# Patient Record
Sex: FEMALE | Race: WHITE | Employment: PART TIME | URBAN - METROPOLITAN AREA
[De-identification: names, ages, dates, MRNs, and addresses within clinical notes are randomized per-mention and may not be internally consistent; named-entity substitution may affect disease eponyms.]

---

## 2018-12-04 ENCOUNTER — OFFICE VISIT (OUTPATIENT)
Dept: INTERNAL MEDICINE | Facility: CLINIC | Age: 58
End: 2018-12-04
Payer: COMMERCIAL

## 2018-12-04 VITALS
WEIGHT: 112 LBS | TEMPERATURE: 97.8 F | OXYGEN SATURATION: 98 % | DIASTOLIC BLOOD PRESSURE: 60 MMHG | SYSTOLIC BLOOD PRESSURE: 110 MMHG | HEIGHT: 62 IN | BODY MASS INDEX: 20.61 KG/M2 | HEART RATE: 71 BPM

## 2018-12-04 DIAGNOSIS — M43.06 LUMBAR SPONDYLOLYSIS: Primary | ICD-10-CM

## 2018-12-04 DIAGNOSIS — R93.5 ABNORMAL MRI, PELVIS: ICD-10-CM

## 2018-12-04 DIAGNOSIS — M81.0 OSTEOPOROSIS WITHOUT CURRENT PATHOLOGICAL FRACTURE, UNSPECIFIED OSTEOPOROSIS TYPE: ICD-10-CM

## 2018-12-04 DIAGNOSIS — Z13.220 SCREENING FOR LIPID DISORDERS: ICD-10-CM

## 2018-12-04 PROBLEM — L70.0 CYSTIC ACNE: Status: ACTIVE | Noted: 2018-12-04

## 2018-12-04 PROBLEM — Z82.49 FAMILY HISTORY OF HEART DISEASE: Status: ACTIVE | Noted: 2017-11-10

## 2018-12-04 PROBLEM — C44.92 SQUAMOUS CELL CARCINOMA OF SKIN: Status: ACTIVE | Noted: 2018-12-04

## 2018-12-04 PROBLEM — R61 NIGHT SWEATS: Status: RESOLVED | Noted: 2018-12-04 | Resolved: 2018-12-04

## 2018-12-04 PROBLEM — R92.1 BREAST CALCIFICATION, RIGHT: Status: ACTIVE | Noted: 2018-12-04

## 2018-12-04 PROCEDURE — 99213 OFFICE O/P EST LOW 20 MIN: CPT | Performed by: INTERNAL MEDICINE

## 2018-12-04 RX ORDER — RISEDRONATE SODIUM 150 MG/1
150 TABLET, FILM COATED ORAL
COMMUNITY
End: 2023-01-19

## 2018-12-04 ASSESSMENT — ENCOUNTER SYMPTOMS
DIARRHEA: 0
PALPITATIONS: 0
POLYDIPSIA: 0
FREQUENCY: 0
UNEXPECTED WEIGHT CHANGE: 0
SLEEP DISTURBANCE: 0
ACTIVITY CHANGE: 0
BLOOD IN STOOL: 0
APPETITE CHANGE: 0
POLYPHAGIA: 0
FATIGUE: 0
SHORTNESS OF BREATH: 0
HEADACHES: 0
COUGH: 0
BACK PAIN: 1
NERVOUS/ANXIOUS: 0
DIZZINESS: 0
DYSPHORIC MOOD: 0
CHEST TIGHTNESS: 0
CONSTIPATION: 0
NAUSEA: 0
ABDOMINAL PAIN: 0
VOMITING: 0
DIFFICULTY URINATING: 0

## 2018-12-04 NOTE — PATIENT INSTRUCTIONS
Problem List Items Addressed This Visit     Lumbar spondylolysis - Primary     Continue your lifting restrictions and core strengthening.         Relevant Orders    CBC and differential    Comprehensive metabolic panel    TSH    Osteoporosis without current pathological fracture    Screening for lipid disorders    Relevant Orders    Lipid panel    Abnormal MRI, pelvis    Relevant Orders    ULTRASOUND PELVIS COMPLETE TRANSABDOMINAL ONLY

## 2018-12-04 NOTE — PROGRESS NOTES
Subjective      Patient ID: Silvia Billings is a 58 y.o. female.    Pt with back pain that comes and goes.  She does not need medications.  No fever or chills.  Does know what to do for improving her pain when it happens.        The following have been reviewed and updated as appropriate in this visit:  Allergies  Meds  Problems       Review of Systems   Constitutional: Negative for activity change, appetite change, fatigue and unexpected weight change.   HENT: Negative for dental problem and hearing loss.    Eyes: Negative for visual disturbance.   Respiratory: Negative for cough, chest tightness and shortness of breath.    Cardiovascular: Negative for chest pain, palpitations and leg swelling.   Gastrointestinal: Negative for abdominal pain, blood in stool, constipation, diarrhea, nausea and vomiting.   Endocrine: Negative for cold intolerance, heat intolerance, polydipsia, polyphagia and polyuria.   Genitourinary: Negative for difficulty urinating, frequency and urgency.   Musculoskeletal: Positive for back pain.   Skin: Negative for rash.   Neurological: Negative for dizziness and headaches.   Psychiatric/Behavioral: Negative for dysphoric mood and sleep disturbance. The patient is not nervous/anxious.        Past Medical History:   Diagnosis Date   • Breast calcification, right    • Cystic acne    • Night sweats    • Osteoporosis    • Squamous cell carcinoma of skin          Past Surgical History:   Procedure Laterality Date   • BREAST BIOPSY     • BREAST LUMPECTOMY           Family History   Problem Relation Age of Onset   • Lung cancer Mother    • Breast cancer Mother    • Hyperlipidemia Mother    • Lung cancer Father    • Heart disease Sister    • Bipolar disorder Brother    • Hyperlipidemia Brother          No current outpatient prescriptions on file prior to visit.     No current facility-administered medications on file prior to visit.          Allergies   Allergen Reactions   • Codeine      Other  "reaction(s): headache  NA         Objective     Vitals:    12/04/18 1522   BP: 110/60   BP Location: Right upper arm   Patient Position: Sitting   Pulse: 71   Temp: 36.6 °C (97.8 °F)   TempSrc: Oral   SpO2: 98%   Weight: 50.8 kg (112 lb)   Height: 1.575 m (5' 2\")     Body mass index is 20.49 kg/m².    Physical Exam   Constitutional: She is oriented to person, place, and time.   HENT:   Head: Normocephalic and atraumatic.   Right Ear: External ear normal.   Left Ear: External ear normal.   Mouth/Throat: Oropharynx is clear and moist.   Eyes: Conjunctivae and EOM are normal. Pupils are equal, round, and reactive to light.   Neck: Normal range of motion. Neck supple. No JVD present. No tracheal deviation present. No thyromegaly present.   Cardiovascular: Normal rate, regular rhythm and normal heart sounds.  Exam reveals no gallop and no friction rub.    No murmur heard.  Pulmonary/Chest: Effort normal and breath sounds normal. No respiratory distress. She has no wheezes. She has no rales.   Abdominal: Soft. Bowel sounds are normal. She exhibits no distension. There is no tenderness.   Musculoskeletal: Normal range of motion. She exhibits no edema.   Lymphadenopathy:     She has no cervical adenopathy.   Neurological: She is alert and oriented to person, place, and time.   Skin: Skin is warm and dry.   Psychiatric: She has a normal mood and affect. Her behavior is normal. Thought content normal.   Vitals reviewed.      Assessment/Plan   Problem List Items Addressed This Visit     Lumbar spondylolysis - Primary     Continue your lifting restrictions and core strengthening.         Relevant Orders    CBC and differential    Comprehensive metabolic panel    TSH    Osteoporosis without current pathological fracture    Screening for lipid disorders    Relevant Orders    Lipid panel    Abnormal MRI, pelvis    Relevant Orders    ULTRASOUND PELVIS COMPLETE TRANSABDOMINAL ONLY          Written education and action steps " suggested to the patient are documented in After Visit Summary / Patient Instructions sections of this encounter.

## 2019-09-17 ENCOUNTER — TELEPHONE (OUTPATIENT)
Dept: INTERNAL MEDICINE | Facility: CLINIC | Age: 59
End: 2019-09-17

## 2019-12-20 ENCOUNTER — OFFICE VISIT (OUTPATIENT)
Dept: INTERNAL MEDICINE | Facility: CLINIC | Age: 59
End: 2019-12-20
Payer: COMMERCIAL

## 2019-12-20 VITALS
TEMPERATURE: 98.5 F | OXYGEN SATURATION: 98 % | WEIGHT: 113.8 LBS | DIASTOLIC BLOOD PRESSURE: 62 MMHG | HEIGHT: 62 IN | RESPIRATION RATE: 16 BRPM | SYSTOLIC BLOOD PRESSURE: 98 MMHG | BODY MASS INDEX: 20.94 KG/M2 | HEART RATE: 79 BPM

## 2019-12-20 DIAGNOSIS — M43.06 LUMBAR SPONDYLOLYSIS: ICD-10-CM

## 2019-12-20 DIAGNOSIS — Z00.00 ENCOUNTER FOR GENERAL ADULT MEDICAL EXAMINATION WITHOUT ABNORMAL FINDINGS: Primary | ICD-10-CM

## 2019-12-20 DIAGNOSIS — C44.92 SQUAMOUS CELL SKIN CANCER: ICD-10-CM

## 2019-12-20 DIAGNOSIS — Z11.4 SCREENING FOR HIV (HUMAN IMMUNODEFICIENCY VIRUS): ICD-10-CM

## 2019-12-20 DIAGNOSIS — E78.00 ELEVATED LDL CHOLESTEROL LEVEL: ICD-10-CM

## 2019-12-20 DIAGNOSIS — R92.1 BREAST CALCIFICATION, RIGHT: ICD-10-CM

## 2019-12-20 DIAGNOSIS — Z80.3 FAMILY HISTORY OF BREAST CANCER: ICD-10-CM

## 2019-12-20 DIAGNOSIS — L70.0 CYSTIC ACNE: ICD-10-CM

## 2019-12-20 DIAGNOSIS — Z13.220 SCREENING FOR LIPID DISORDERS: ICD-10-CM

## 2019-12-20 DIAGNOSIS — Z82.49 FAMILY HISTORY OF HEART DISEASE: ICD-10-CM

## 2019-12-20 DIAGNOSIS — M81.0 OSTEOPOROSIS WITHOUT CURRENT PATHOLOGICAL FRACTURE, UNSPECIFIED OSTEOPOROSIS TYPE: ICD-10-CM

## 2019-12-20 DIAGNOSIS — R93.5 ABNORMAL MRI, PELVIS: ICD-10-CM

## 2019-12-20 PROCEDURE — 99396 PREV VISIT EST AGE 40-64: CPT | Performed by: INTERNAL MEDICINE

## 2019-12-20 ASSESSMENT — ENCOUNTER SYMPTOMS
FATIGUE: 0
NAUSEA: 0
HEADACHES: 0
ABDOMINAL PAIN: 0
CHEST TIGHTNESS: 0
DIFFICULTY URINATING: 0
SLEEP DISTURBANCE: 0
DIZZINESS: 0
CONSTIPATION: 0
SHORTNESS OF BREATH: 0
PALPITATIONS: 0
DYSPHORIC MOOD: 0
VOMITING: 0
POLYPHAGIA: 0
DIARRHEA: 0
COUGH: 0
UNEXPECTED WEIGHT CHANGE: 0
FREQUENCY: 0
BLOOD IN STOOL: 0
ACTIVITY CHANGE: 0
NERVOUS/ANXIOUS: 0
POLYDIPSIA: 0
APPETITE CHANGE: 0

## 2019-12-20 NOTE — PROGRESS NOTES
Annual Exam       dexa - improved.  Seeing rheumatology.  Staying on actonel.  Has right ovarian cyst.  Seeing gyn.      Review of Systems   Constitutional: Negative for activity change, appetite change, fatigue and unexpected weight change.   HENT: Negative for dental problem and hearing loss.    Eyes: Negative for visual disturbance.   Respiratory: Negative for cough, chest tightness and shortness of breath.    Cardiovascular: Negative for chest pain, palpitations and leg swelling.   Gastrointestinal: Negative for abdominal pain, blood in stool, constipation, diarrhea, nausea and vomiting.   Endocrine: Negative for cold intolerance, heat intolerance, polydipsia, polyphagia and polyuria.   Genitourinary: Negative for difficulty urinating, frequency and urgency.   Skin: Negative for rash.   Neurological: Negative for dizziness and headaches.   Psychiatric/Behavioral: Negative for dysphoric mood and sleep disturbance. The patient is not nervous/anxious.          Current Outpatient Medications:   •  risedronate (ACTONEL) 150 mg tablet, Take by mouth daily.  , Disp: , Rfl:     Allergies   Allergen Reactions   • Codeine      Other reaction(s): headache  NA         Past Medical History:   Diagnosis Date   • Breast calcification, right    • Cystic acne    • Night sweats    • Osteoporosis    • Squamous cell carcinoma of skin        Past Surgical History:   Procedure Laterality Date   • BREAST BIOPSY     • BREAST LUMPECTOMY         Family History   Problem Relation Age of Onset   • Lung cancer Biological Mother    • Breast cancer Biological Mother    • Hyperlipidemia Biological Mother    • Lung cancer Biological Father    • Heart disease Biological Sister    • Bipolar disorder Biological Brother    • Hyperlipidemia Biological Brother        Social History     Socioeconomic History   • Marital status:      Spouse name: None   • Number of children: None   • Years of education: None   • Highest education level: None    Occupational History   • None   Social Needs   • Financial resource strain: None   • Food insecurity:     Worry: None     Inability: None   • Transportation needs:     Medical: None     Non-medical: None   Tobacco Use   • Smoking status: Never Smoker   • Smokeless tobacco: Former User   Substance and Sexual Activity   • Alcohol use: Yes   • Drug use: None   • Sexual activity: None   Lifestyle   • Physical activity:     Days per week: None     Minutes per session: None   • Stress: None   Relationships   • Social connections:     Talks on phone: None     Gets together: None     Attends Yazidi service: None     Active member of club or organization: None     Attends meetings of clubs or organizations: None     Relationship status: None   • Intimate partner violence:     Fear of current or ex partner: None     Emotionally abused: None     Physically abused: None     Forced sexual activity: None   Other Topics Concern   • None   Social History Narrative    Occupation - Tobacco Treatment       Vitals:    12/20/19 1011   BP: 98/62   Pulse: 79   Resp: 16   Temp: 36.9 °C (98.5 °F)   SpO2: 98%     Body mass index is 20.81 kg/m².      Physical Exam   Constitutional: She is oriented to person, place, and time. She appears well-developed and well-nourished. No distress.   HENT:   Head: Normocephalic and atraumatic.   Right Ear: External ear normal.   Left Ear: External ear normal.   Mouth/Throat: Oropharynx is clear and moist.   Eyes: Pupils are equal, round, and reactive to light. Conjunctivae and EOM are normal.   Neck: Normal range of motion. Neck supple. No JVD present. No tracheal deviation present. No thyromegaly present.   Cardiovascular: Normal rate, regular rhythm and normal heart sounds. Exam reveals no gallop and no friction rub.   No murmur heard.  Pulmonary/Chest: Effort normal and breath sounds normal. No respiratory distress. She has no wheezes. She has no rales.   Abdominal: Soft. Bowel sounds are normal.  She exhibits no distension. There is no tenderness.   Musculoskeletal: Normal range of motion. She exhibits no edema.   Lymphadenopathy:     She has no cervical adenopathy.   Neurological: She is alert and oriented to person, place, and time.   Skin: Skin is warm and dry. She is not diaphoretic.   Psychiatric: She has a normal mood and affect. Her behavior is normal. Thought content normal.   Vitals reviewed.      Assessment/Plan     Problem List Items Addressed This Visit        Musculoskeletal    Lumbar spondylolysis    Osteoporosis without current pathological fracture    Relevant Orders    CBC and differential    TSH       Other    Squamous cell skin cancer    Relevant Orders    CBC and differential    Cystic acne    Relevant Orders    CBC and differential    TSH    Breast calcification, right    Family history of breast cancer    Family history of heart disease    Relevant Orders    CT HEART CORONARY CALCIUM SCORE    Screening for lipid disorders     10 year risk of cardiovascular disease is 1.8%.      Your cholesterol was mildly elevated.  I recommend diet (watch carbs and sugar and minimize animal products) and exercise more if you can.  We can recheck next year.           Abnormal MRI, pelvis    Encounter for general adult medical examination without abnormal findings - Primary     Please follow up with your gyn, your dermatologist and your dentist, if you have not already done so.  Exercise 3-5x per week.  Eat a diet low in sugar, simple carbs & low in animal products.  Avoid more than 1 alcoholic drink per day.  Do not text and drive.    Dietary calcium (1200-1500mg daily in 3 divided portions), vitamin D 1000iu daily and weight bearing exercise are all important for healthy bones.           Screening for HIV (human immunodeficiency virus)    Relevant Orders    HIV 1,2 AB P24 AG    Elevated LDL cholesterol level    Relevant Orders    CT HEART CORONARY CALCIUM SCORE

## 2019-12-20 NOTE — PATIENT INSTRUCTIONS
Problem List Items Addressed This Visit        Musculoskeletal    Lumbar spondylolysis    Osteoporosis without current pathological fracture    Relevant Orders    CBC and differential    TSH       Other    Squamous cell skin cancer    Relevant Orders    CBC and differential    Cystic acne    Relevant Orders    CBC and differential    TSH    Breast calcification, right    Family history of breast cancer    Family history of heart disease    Relevant Orders    CT HEART CORONARY CALCIUM SCORE    Screening for lipid disorders     10 year risk of cardiovascular disease is 1.8%.      Your cholesterol was mildly elevated.  I recommend diet (watch carbs and sugar and minimize animal products) and exercise more if you can.  We can recheck next year.           Abnormal MRI, pelvis    Encounter for general adult medical examination without abnormal findings - Primary     Please follow up with your gyn, your dermatologist and your dentist, if you have not already done so.  Exercise 3-5x per week.  Eat a diet low in sugar, simple carbs & low in animal products.  Avoid more than 1 alcoholic drink per day.  Do not text and drive.    Dietary calcium (1200-1500mg daily in 3 divided portions), vitamin D 1000iu daily and weight bearing exercise are all important for healthy bones.           Screening for HIV (human immunodeficiency virus)    Relevant Orders    HIV 1,2 AB P24 AG    Elevated LDL cholesterol level    Relevant Orders    CT HEART CORONARY CALCIUM SCORE

## 2019-12-20 NOTE — ASSESSMENT & PLAN NOTE
Please follow up with your gyn, your dermatologist and your dentist, if you have not already done so.  Exercise 3-5x per week.  Eat a diet low in sugar, simple carbs & low in animal products.  Avoid more than 1 alcoholic drink per day.  Do not text and drive.    Dietary calcium (1200-1500mg daily in 3 divided portions), vitamin D 1000iu daily and weight bearing exercise are all important for healthy bones.

## 2020-03-02 ENCOUNTER — TELEPHONE (OUTPATIENT)
Dept: INTERNAL MEDICINE | Facility: CLINIC | Age: 60
End: 2020-03-02

## 2020-03-02 NOTE — TELEPHONE ENCOUNTER
Pt called in she is requesting a call from you to discuss her blood test results. Pt can be reached at [phone]

## 2020-03-18 ENCOUNTER — TELEPHONE (OUTPATIENT)
Dept: INTERNAL MEDICINE | Facility: CLINIC | Age: 60
End: 2020-03-18

## 2020-03-18 ENCOUNTER — DOCUMENTATION (OUTPATIENT)
Dept: INTERNAL MEDICINE | Facility: CLINIC | Age: 60
End: 2020-03-18

## 2020-03-18 RX ORDER — ROSUVASTATIN CALCIUM 5 MG/1
5 TABLET, COATED ORAL DAILY
Qty: 30 TABLET | Refills: 3 | Status: SHIPPED | OUTPATIENT
Start: 2020-03-18 | End: 2021-11-30

## 2020-03-18 NOTE — PROGRESS NOTES
Chol numbers are 247  hdl 90  ldl 145  Trig 62    10yr risk 3.1.  No statin at this time, especially in light of her numbers from november 2019 / 2018.    Diet and exercise.

## 2020-03-18 NOTE — TELEPHONE ENCOUNTER
Pt called to state that LDL was  145     Total cholesterol   was 247     This was drawn 2-.    She will be faxing over the results as well.  She is asking for a phone call back in regards to results.    She can be reached on number [phone]    Thank you

## 2020-09-11 ENCOUNTER — HOSPITAL ENCOUNTER (OUTPATIENT)
Dept: RADIOLOGY | Age: 60
Discharge: HOME | End: 2020-09-11
Attending: INTERNAL MEDICINE

## 2020-09-11 DIAGNOSIS — E78.00 ELEVATED LDL CHOLESTEROL LEVEL: ICD-10-CM

## 2020-09-11 DIAGNOSIS — Z82.49 FAMILY HISTORY OF HEART DISEASE: ICD-10-CM

## 2020-09-11 PROCEDURE — 75571 CT HRT W/O DYE W/CA TEST: CPT

## 2020-12-04 ENCOUNTER — OFFICE VISIT (OUTPATIENT)
Dept: INTERNAL MEDICINE | Facility: CLINIC | Age: 60
End: 2020-12-04
Payer: COMMERCIAL

## 2020-12-04 VITALS
RESPIRATION RATE: 14 BRPM | BODY MASS INDEX: 20.32 KG/M2 | WEIGHT: 110.4 LBS | TEMPERATURE: 97.6 F | HEIGHT: 62 IN | DIASTOLIC BLOOD PRESSURE: 68 MMHG | HEART RATE: 71 BPM | SYSTOLIC BLOOD PRESSURE: 102 MMHG | OXYGEN SATURATION: 98 %

## 2020-12-04 DIAGNOSIS — M43.06 LUMBAR SPONDYLOLYSIS: ICD-10-CM

## 2020-12-04 DIAGNOSIS — E78.00 ELEVATED LDL CHOLESTEROL LEVEL: ICD-10-CM

## 2020-12-04 DIAGNOSIS — Z00.00 ENCOUNTER FOR GENERAL ADULT MEDICAL EXAMINATION WITHOUT ABNORMAL FINDINGS: Primary | ICD-10-CM

## 2020-12-04 DIAGNOSIS — Z13.220 SCREENING FOR LIPID DISORDERS: ICD-10-CM

## 2020-12-04 DIAGNOSIS — M81.0 OSTEOPOROSIS WITHOUT CURRENT PATHOLOGICAL FRACTURE, UNSPECIFIED OSTEOPOROSIS TYPE: ICD-10-CM

## 2020-12-04 DIAGNOSIS — C44.92 SQUAMOUS CELL SKIN CANCER: ICD-10-CM

## 2020-12-04 DIAGNOSIS — Z82.49 FAMILY HISTORY OF HEART DISEASE: ICD-10-CM

## 2020-12-04 DIAGNOSIS — Z80.3 FAMILY HISTORY OF BREAST CANCER: ICD-10-CM

## 2020-12-04 PROCEDURE — 99396 PREV VISIT EST AGE 40-64: CPT | Performed by: INTERNAL MEDICINE

## 2020-12-04 ASSESSMENT — ENCOUNTER SYMPTOMS
POLYDIPSIA: 0
UNEXPECTED WEIGHT CHANGE: 0
COUGH: 0
SHORTNESS OF BREATH: 0
HEADACHES: 0
DYSPHORIC MOOD: 0
NAUSEA: 0
DIARRHEA: 0
CHEST TIGHTNESS: 0
PALPITATIONS: 0
DIFFICULTY URINATING: 0
NERVOUS/ANXIOUS: 0
FATIGUE: 0
POLYPHAGIA: 0
SLEEP DISTURBANCE: 0
APPETITE CHANGE: 0
ABDOMINAL PAIN: 0
DIZZINESS: 0
VOMITING: 0
CONSTIPATION: 0
BLOOD IN STOOL: 0
FREQUENCY: 0
ACTIVITY CHANGE: 0

## 2020-12-04 NOTE — PATIENT INSTRUCTIONS
Problem List Items Addressed This Visit        Musculoskeletal    Lumbar spondylolysis    Osteoporosis without current pathological fracture     In addition to your medication, we recommend the following strategies to help prevent further bone loss: Dietary calcium (1200-1500mg daily in 3 divided portions), vitamin D 1000iu daily and weight bearing exercise.  These measures are all important for healthy bones. We should recheck your dexa every 2 years.           Relevant Orders    Vitamin D 25 hydroxy       Other    Squamous cell skin cancer    Family history of breast cancer    Family history of heart disease    Screening for lipid disorders    Encounter for general adult medical examination without abnormal findings - Primary     Please follow up with your gyn, your dermatologist and your dentist, if you have not already done so.  Exercise 3-5x per week.  Eat a diet low in sugar, simple carbs & low in animal products.  Avoid more than 1 alcoholic drink per day.  Do not text and drive.    Be sure to wear your mask when in public and to socially distance (at least 6 feet).           Elevated LDL cholesterol level    Relevant Orders    CBC and differential    Comprehensive metabolic panel    Lipid panel    TSH

## 2020-12-04 NOTE — ASSESSMENT & PLAN NOTE
Please follow up with your gyn, your dermatologist and your dentist, if you have not already done so.  Exercise 3-5x per week.  Eat a diet low in sugar, simple carbs & low in animal products.  Avoid more than 1 alcoholic drink per day.  Do not text and drive.    Be sure to wear your mask when in public and to socially distance (at least 6 feet).

## 2020-12-04 NOTE — PROGRESS NOTES
No chief complaint on file.       Has right ovarian cyst.  She had pain right lower quadrant that resolved.  The cyst is being followed by gyn.        Review of Systems   Constitutional: Negative for activity change, appetite change, fatigue and unexpected weight change.   HENT: Negative for dental problem and hearing loss.    Eyes: Negative for visual disturbance.   Respiratory: Negative for cough, chest tightness and shortness of breath.    Cardiovascular: Negative for chest pain, palpitations and leg swelling.   Gastrointestinal: Negative for abdominal pain, blood in stool, constipation, diarrhea, nausea and vomiting.   Endocrine: Negative for cold intolerance, heat intolerance, polydipsia, polyphagia and polyuria.   Genitourinary: Negative for difficulty urinating, frequency and urgency.   Skin: Negative for rash.   Neurological: Negative for dizziness and headaches.   Psychiatric/Behavioral: Negative for dysphoric mood and sleep disturbance. The patient is not nervous/anxious.          Current Outpatient Medications:   •  risedronate (ACTONEL) 150 mg tablet, Take by mouth daily.  , Disp: , Rfl:   •  rosuvastatin (CRESTOR) 5 mg tablet, Take 1 tablet (5 mg total) by mouth daily. (Patient not taking: Reported on 12/4/2020 ), Disp: 30 tablet, Rfl: 3    Allergies   Allergen Reactions   • Codeine      Other reaction(s): headache  NA         Past Medical History:   Diagnosis Date   • Breast calcification, right    • Cystic acne    • Lipid disorder    • Night sweats    • Osteoporosis    • Squamous cell carcinoma of skin        Past Surgical History:   Procedure Laterality Date   • BREAST BIOPSY     • BREAST LUMPECTOMY         Family History   Problem Relation Age of Onset   • Lung cancer Biological Mother    • Breast cancer Biological Mother    • Hyperlipidemia Biological Mother    • Lung cancer Biological Father    • Heart disease Biological Sister    • Bipolar disorder Biological Brother    • Hyperlipidemia  Biological Brother        Social History     Socioeconomic History   • Marital status:      Spouse name: None   • Number of children: None   • Years of education: None   • Highest education level: None   Occupational History   • None   Social Needs   • Financial resource strain: None   • Food insecurity     Worry: None     Inability: None   • Transportation needs     Medical: None     Non-medical: None   Tobacco Use   • Smoking status: Never Smoker   • Smokeless tobacco: Former User   Substance and Sexual Activity   • Alcohol use: Yes     Alcohol/week: 3.0 standard drinks     Types: 3 Glasses of wine per week   • Drug use: None   • Sexual activity: None   Lifestyle   • Physical activity     Days per week: None     Minutes per session: None   • Stress: None   Relationships   • Social connections     Talks on phone: None     Gets together: None     Attends Confucianist service: None     Active member of club or organization: None     Attends meetings of clubs or organizations: None     Relationship status: None   • Intimate partner violence     Fear of current or ex partner: None     Emotionally abused: None     Physically abused: None     Forced sexual activity: None   Other Topics Concern   • None   Social History Narrative    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      Physical Exam  Vitals signs reviewed.   HENT:      Head: Normocephalic and atraumatic.      Right Ear: External ear normal.      Left Ear: External ear normal.   Eyes:      Conjunctiva/sclera: Conjunctivae normal.      Pupils: Pupils are equal, round, and reactive to light.   Neck:      Musculoskeletal: Normal range of motion and neck supple.      Thyroid: No thyroid mass or thyromegaly.      Vascular: No carotid bruit or JVD.      Trachea: No  tracheal deviation.   Cardiovascular:      Rate and Rhythm: Normal rate and regular rhythm.      Heart sounds: Normal heart sounds. No murmur. No friction rub. No gallop.    Pulmonary:      Effort: Pulmonary effort is normal. No respiratory distress.      Breath sounds: Normal breath sounds. No wheezing or rales.   Abdominal:      General: Bowel sounds are normal. There is no distension.      Palpations: Abdomen is soft.      Tenderness: There is no abdominal tenderness.   Musculoskeletal: Normal range of motion.   Lymphadenopathy:      Cervical: No cervical adenopathy.   Skin:     General: Skin is warm and dry.   Neurological:      Mental Status: She is alert and oriented to person, place, and time.   Psychiatric:         Behavior: Behavior normal.         Thought Content: Thought content normal.         Assessment/Plan     Problem List Items Addressed This Visit        Musculoskeletal    Lumbar spondylolysis    Osteoporosis without current pathological fracture     In addition to your medication, we recommend the following strategies to help prevent further bone loss: Dietary calcium (1200-1500mg daily in 3 divided portions), vitamin D 1000iu daily and weight bearing exercise.  These measures are all important for healthy bones. We should recheck your dexa every 2 years.           Relevant Orders    Vitamin D 25 hydroxy       Other    Squamous cell skin cancer    Family history of breast cancer    Family history of heart disease    Screening for lipid disorders    Encounter for general adult medical examination without abnormal findings - Primary     Please follow up with your gyn, your dermatologist and your dentist, if you have not already done so.  Exercise 3-5x per week.  Eat a diet low in sugar, simple carbs & low in animal products.  Avoid more than 1 alcoholic drink per day.  Do not text and drive.    Be sure to wear your mask when in public and to socially distance (at least 6 feet).           Elevated LDL  cholesterol level    Relevant Orders    CBC and differential    Comprehensive metabolic panel    Lipid panel    TSH

## 2020-12-04 NOTE — ASSESSMENT & PLAN NOTE
In addition to your medication, we recommend the following strategies to help prevent further bone loss: Dietary calcium (1200-1500mg daily in 3 divided portions), vitamin D 1000iu daily and weight bearing exercise.  These measures are all important for healthy bones. We should recheck your dexa every 2 years.

## 2020-12-14 ENCOUNTER — TELEPHONE (OUTPATIENT)
Dept: INTERNAL MEDICINE | Facility: CLINIC | Age: 60
End: 2020-12-14

## 2020-12-14 NOTE — TELEPHONE ENCOUNTER
Dr. Duque    Pt called and wanted to let you know that she is experiencing some side effects from the Crestor, she has been having muscle aches, cramping and not feeling well and she would like advice how she can take it so she can mitigate these side effects    Phone # [phone]

## 2021-09-21 ENCOUNTER — TELEPHONE (OUTPATIENT)
Dept: INTERNAL MEDICINE | Facility: CLINIC | Age: 61
End: 2021-09-21

## 2021-09-21 NOTE — TELEPHONE ENCOUNTER
Please schedule pt for appt this sept 23, Thursday at 4:40.  It is an office visit, not telemed.  Thanks.

## 2021-09-23 ENCOUNTER — OFFICE VISIT (OUTPATIENT)
Dept: INTERNAL MEDICINE | Facility: CLINIC | Age: 61
End: 2021-09-23
Payer: COMMERCIAL

## 2021-09-23 VITALS
SYSTOLIC BLOOD PRESSURE: 100 MMHG | OXYGEN SATURATION: 98 % | HEIGHT: 62 IN | TEMPERATURE: 98.1 F | BODY MASS INDEX: 20.54 KG/M2 | RESPIRATION RATE: 16 BRPM | HEART RATE: 65 BPM | WEIGHT: 111.6 LBS | DIASTOLIC BLOOD PRESSURE: 60 MMHG

## 2021-09-23 DIAGNOSIS — Z80.3 FAMILY HISTORY OF BREAST CANCER: ICD-10-CM

## 2021-09-23 DIAGNOSIS — E78.00 ELEVATED LDL CHOLESTEROL LEVEL: ICD-10-CM

## 2021-09-23 DIAGNOSIS — F51.01 PRIMARY INSOMNIA: Primary | ICD-10-CM

## 2021-09-23 DIAGNOSIS — Z80.1 FAMILY HISTORY OF LUNG CANCER: ICD-10-CM

## 2021-09-23 DIAGNOSIS — R53.83 OTHER FATIGUE: ICD-10-CM

## 2021-09-23 DIAGNOSIS — L30.9 DERMATITIS: ICD-10-CM

## 2021-09-23 DIAGNOSIS — M81.0 OSTEOPOROSIS WITHOUT CURRENT PATHOLOGICAL FRACTURE, UNSPECIFIED OSTEOPOROSIS TYPE: ICD-10-CM

## 2021-09-23 DIAGNOSIS — E55.9 VITAMIN D DEFICIENCY: ICD-10-CM

## 2021-09-23 DIAGNOSIS — Z80.0 FAMILY HISTORY OF PANCREATIC CANCER: ICD-10-CM

## 2021-09-23 PROCEDURE — 99213 OFFICE O/P EST LOW 20 MIN: CPT | Performed by: INTERNAL MEDICINE

## 2021-09-23 PROCEDURE — 3008F BODY MASS INDEX DOCD: CPT | Performed by: INTERNAL MEDICINE

## 2021-09-23 ASSESSMENT — PATIENT HEALTH QUESTIONNAIRE - PHQ9: SUM OF ALL RESPONSES TO PHQ9 QUESTIONS 1 & 2: 0

## 2021-09-23 ASSESSMENT — ENCOUNTER SYMPTOMS
BLOOD IN STOOL: 0
POLYDIPSIA: 0
ACTIVITY CHANGE: 0
NERVOUS/ANXIOUS: 0
NAUSEA: 0
DIARRHEA: 0
PALPITATIONS: 0
SHORTNESS OF BREATH: 0
HEADACHES: 0
APPETITE CHANGE: 0
CHEST TIGHTNESS: 0
CONSTIPATION: 0
FATIGUE: 0
COUGH: 0
FREQUENCY: 0
UNEXPECTED WEIGHT CHANGE: 0
DIFFICULTY URINATING: 0
VOMITING: 0
POLYPHAGIA: 0
ABDOMINAL PAIN: 0
DYSPHORIC MOOD: 0
DIZZINESS: 0
SLEEP DISTURBANCE: 0

## 2021-09-23 NOTE — PATIENT INSTRUCTIONS
Problem List Items Addressed This Visit        Digestive    Vitamin D deficiency    Relevant Orders    Vitamin D 25 hydroxy       Musculoskeletal    Osteoporosis without current pathological fracture       Infectious/Inflammatory    Dermatitis     Follow up with derm as we discussed.            Other    Family history of breast cancer    Relevant Orders    Ambulatory referral to Genetics    Elevated LDL cholesterol level    Relevant Orders    Lipid panel    Urinalysis with Reflex Culture    Other fatigue    Relevant Orders    CBC and differential    TSH    Primary insomnia - Primary    Relevant Orders    TSH    Family history of pancreatic cancer    Relevant Orders    CBC and differential    Comprehensive metabolic panel    Ambulatory referral to Genetics    Family history of lung cancer    Relevant Orders    Ambulatory referral to Genetics

## 2021-09-23 NOTE — PROGRESS NOTES
GI Problem and Rash       Has area on her right wrist that gets redness from time to time.  Same area.        Review of Systems   Constitutional: Negative for activity change, appetite change, fatigue and unexpected weight change.   HENT: Negative for dental problem and hearing loss.    Eyes: Negative for visual disturbance.   Respiratory: Negative for cough, chest tightness and shortness of breath.    Cardiovascular: Negative for chest pain, palpitations and leg swelling.   Gastrointestinal: Negative for abdominal pain, blood in stool, constipation, diarrhea, nausea and vomiting.   Endocrine: Negative for cold intolerance, heat intolerance, polydipsia, polyphagia and polyuria.   Genitourinary: Negative for difficulty urinating, frequency and urgency.   Skin: Negative for rash.   Neurological: Negative for dizziness and headaches.   Psychiatric/Behavioral: Negative for dysphoric mood and sleep disturbance. The patient is not nervous/anxious.          Current Outpatient Medications:   •  risedronate (ACTONEL) 150 mg tablet, Take by mouth daily.  , Disp: , Rfl:   •  rosuvastatin (CRESTOR) 5 mg tablet, Take 1 tablet (5 mg total) by mouth daily. (Patient taking differently: Take 5 mg by mouth once a week.  ), Disp: 30 tablet, Rfl: 3    Allergies   Allergen Reactions   • Codeine      Other reaction(s): headache  NA         Past Medical History:   Diagnosis Date   • Breast calcification, right    • Cystic acne    • Lipid disorder    • Night sweats    • Osteoporosis    • Squamous cell carcinoma of skin        Past Surgical History:   Procedure Laterality Date   • BREAST BIOPSY     • BREAST LUMPECTOMY         Family History   Problem Relation Age of Onset   • Lung cancer Biological Mother    • Breast cancer Biological Mother    • Hyperlipidemia Biological Mother    • Lung cancer Biological Father    • Heart disease Biological Sister    • Bipolar disorder Biological Brother    • Hyperlipidemia Biological Brother         Social History     Socioeconomic History   • Marital status:      Spouse name: None   • Number of children: None   • Years of education: None   • Highest education level: None   Occupational History   • None   Tobacco Use   • Smoking status: Never Smoker   • Smokeless tobacco: Former User   Vaping Use   • Vaping Use: Never used   Substance and Sexual Activity   • Alcohol use: Yes     Alcohol/week: 3.0 standard drinks     Types: 3 Glasses of wine per week   • Drug use: Never   • Sexual activity: Defer     Birth control/protection: Post-menopausal   Other Topics Concern   • None   Social History Narrative    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    Exercise walking / free weights     Social Determinants of Health     Financial Resource Strain:    • Difficulty of Paying Living Expenses: Not on file   Food Insecurity:    • Worried About Running Out of Food in the Last Year: Not on file   • Ran Out of Food in the Last Year: Not on file   Transportation Needs:    • Lack of Transportation (Medical): Not on file   • Lack of Transportation (Non-Medical): Not on file   Physical Activity:    • Days of Exercise per Week: Not on file   • Minutes of Exercise per Session: Not on file   Stress:    • Feeling of Stress : Not on file   Social Connections:    • Frequency of Communication with Friends and Family: Not on file   • Frequency of Social Gatherings with Friends and Family: Not on file   • Attends [employer] Services: Not on file   • Active Member of Clubs or Organizations: Not on file   • Attends Club or Organization Meetings: Not on file   • Marital Status: Not on file   Intimate Partner Violence:    • Fear of Current or Ex-Partner: Not on file   • Emotionally Abused: Not on file   • Physically Abused: Not on file   • Sexually Abused: Not on file   Housing Stability:    • Unable to Pay for Housing in the Last Year: Not on file   • Number of Places Lived in the Last Year: Not on file   •  Unstable Housing in the Last Year: Not on file       Vitals:    09/23/21 1644   BP: 100/60   Pulse: 65   Resp: 16   Temp: 36.7 °C (98.1 °F)   SpO2: 98%     Body mass index is 20.41 kg/m².      Physical Exam  Vitals reviewed.   HENT:      Head: Normocephalic and atraumatic.      Right Ear: External ear normal.      Left Ear: External ear normal.   Eyes:      Conjunctiva/sclera: Conjunctivae normal.      Pupils: Pupils are equal, round, and reactive to light.   Neck:      Thyroid: No thyroid mass or thyromegaly.      Vascular: No carotid bruit or JVD.      Trachea: No tracheal deviation.   Cardiovascular:      Rate and Rhythm: Normal rate and regular rhythm.      Heart sounds: Normal heart sounds. No murmur heard.  No friction rub. No gallop.    Pulmonary:      Effort: Pulmonary effort is normal. No respiratory distress.      Breath sounds: Normal breath sounds. No wheezing or rales.   Abdominal:      General: Bowel sounds are normal. There is no distension.      Palpations: Abdomen is soft.      Tenderness: There is no abdominal tenderness.   Musculoskeletal:         General: Normal range of motion.      Cervical back: Normal range of motion and neck supple.   Lymphadenopathy:      Cervical: No cervical adenopathy.   Skin:     General: Skin is warm and dry.      Comments: Rash in skin folds on abdomen.  Mildly erythematous.  Discoloration at the right wrist, red ovid area without background erythema.     Neurological:      Mental Status: She is alert and oriented to person, place, and time.   Psychiatric:         Behavior: Behavior normal.         Thought Content: Thought content normal.         Assessment/Plan     Problem List Items Addressed This Visit        Digestive    Vitamin D deficiency    Relevant Orders    Vitamin D 25 hydroxy       Musculoskeletal    Osteoporosis without current pathological fracture       Infectious/Inflammatory    Dermatitis     Follow up with derm as we discussed.            Other     Family history of breast cancer    Relevant Orders    Ambulatory referral to Genetics    Elevated LDL cholesterol level    Relevant Orders    Lipid panel    Urinalysis with Reflex Culture    Other fatigue    Relevant Orders    CBC and differential    TSH    Primary insomnia - Primary    Relevant Orders    TSH    Family history of pancreatic cancer    Relevant Orders    CBC and differential    Comprehensive metabolic panel    Ambulatory referral to Genetics    Family history of lung cancer    Relevant Orders    Ambulatory referral to Genetics

## 2021-11-30 RX ORDER — ROSUVASTATIN CALCIUM 5 MG/1
5 TABLET, COATED ORAL DAILY
Qty: 30 TABLET | Refills: 3 | Status: SHIPPED | OUTPATIENT
Start: 2021-11-30 | End: 2023-02-09

## 2022-01-13 ENCOUNTER — OFFICE VISIT (OUTPATIENT)
Dept: INTERNAL MEDICINE | Facility: CLINIC | Age: 62
End: 2022-01-13
Payer: COMMERCIAL

## 2022-01-13 VITALS
BODY MASS INDEX: 20.28 KG/M2 | SYSTOLIC BLOOD PRESSURE: 90 MMHG | WEIGHT: 110.2 LBS | RESPIRATION RATE: 16 BRPM | HEIGHT: 62 IN | DIASTOLIC BLOOD PRESSURE: 60 MMHG | HEART RATE: 73 BPM | OXYGEN SATURATION: 98 % | TEMPERATURE: 97.9 F

## 2022-01-13 DIAGNOSIS — Z82.49 FAMILY HISTORY OF HEART DISEASE: ICD-10-CM

## 2022-01-13 DIAGNOSIS — Z80.0 FAMILY HISTORY OF PANCREATIC CANCER: ICD-10-CM

## 2022-01-13 DIAGNOSIS — E55.9 VITAMIN D DEFICIENCY: ICD-10-CM

## 2022-01-13 DIAGNOSIS — M43.06 LUMBAR SPONDYLOLYSIS: ICD-10-CM

## 2022-01-13 DIAGNOSIS — E78.00 ELEVATED LDL CHOLESTEROL LEVEL: ICD-10-CM

## 2022-01-13 DIAGNOSIS — Z00.00 ROUTINE ADULT HEALTH MAINTENANCE: Primary | ICD-10-CM

## 2022-01-13 DIAGNOSIS — E78.9 LIPID DISORDER: ICD-10-CM

## 2022-01-13 DIAGNOSIS — M81.0 OSTEOPOROSIS WITHOUT CURRENT PATHOLOGICAL FRACTURE, UNSPECIFIED OSTEOPOROSIS TYPE: ICD-10-CM

## 2022-01-13 DIAGNOSIS — Z80.1 FAMILY HISTORY OF LUNG CANCER: ICD-10-CM

## 2022-01-13 DIAGNOSIS — F51.01 PRIMARY INSOMNIA: ICD-10-CM

## 2022-01-13 DIAGNOSIS — C44.92 SQUAMOUS CELL SKIN CANCER: ICD-10-CM

## 2022-01-13 DIAGNOSIS — L70.0 CYSTIC ACNE: ICD-10-CM

## 2022-01-13 PROBLEM — L30.9 DERMATITIS: Status: RESOLVED | Noted: 2021-09-23 | Resolved: 2022-01-13

## 2022-01-13 PROCEDURE — 3008F BODY MASS INDEX DOCD: CPT | Performed by: INTERNAL MEDICINE

## 2022-01-13 PROCEDURE — 90715 TDAP VACCINE 7 YRS/> IM: CPT | Performed by: INTERNAL MEDICINE

## 2022-01-13 PROCEDURE — 99396 PREV VISIT EST AGE 40-64: CPT | Mod: 25 | Performed by: INTERNAL MEDICINE

## 2022-01-13 PROCEDURE — 90471 IMMUNIZATION ADMIN: CPT | Performed by: INTERNAL MEDICINE

## 2022-01-13 ASSESSMENT — ENCOUNTER SYMPTOMS
POLYDIPSIA: 0
UNEXPECTED WEIGHT CHANGE: 0
CONSTIPATION: 0
ABDOMINAL PAIN: 0
PALPITATIONS: 0
ACTIVITY CHANGE: 0
DIARRHEA: 0
VOMITING: 0
APPETITE CHANGE: 0
DIZZINESS: 0
BLOOD IN STOOL: 0
NERVOUS/ANXIOUS: 0
DYSPHORIC MOOD: 0
HEADACHES: 0
POLYPHAGIA: 0
SHORTNESS OF BREATH: 0
COUGH: 0
NAUSEA: 0
DIFFICULTY URINATING: 0
FREQUENCY: 0
SLEEP DISTURBANCE: 0
CHEST TIGHTNESS: 0
FATIGUE: 0

## 2022-01-13 ASSESSMENT — PATIENT HEALTH QUESTIONNAIRE - PHQ9: SUM OF ALL RESPONSES TO PHQ9 QUESTIONS 1 & 2: 0

## 2022-01-13 NOTE — PROGRESS NOTES
Annual Exam       HPI    Review of Systems   Constitutional: Negative for activity change, appetite change, fatigue and unexpected weight change.   HENT: Negative for dental problem and hearing loss.    Eyes: Negative for visual disturbance.   Respiratory: Negative for cough, chest tightness and shortness of breath.    Cardiovascular: Negative for chest pain, palpitations and leg swelling.   Gastrointestinal: Negative for abdominal pain, blood in stool, constipation, diarrhea, nausea and vomiting.   Endocrine: Negative for cold intolerance, heat intolerance, polydipsia, polyphagia and polyuria.   Genitourinary: Negative for difficulty urinating, frequency and urgency.   Skin: Negative for rash.   Neurological: Negative for dizziness and headaches.   Psychiatric/Behavioral: Negative for dysphoric mood and sleep disturbance. The patient is not nervous/anxious.          Current Outpatient Medications:   •  cholecalciferol, vitamin D3, (VITAMIN D3 ORAL), Take by mouth., Disp: , Rfl:   •  risedronate (ACTONEL) 150 mg tablet, Take 150 mg by mouth every 30 (thirty) days.  , Disp: , Rfl:   •  rosuvastatin 5 mg tablet, TAKE 1 TABLET (5 MG TOTAL) BY MOUTH DAILY. (Patient taking differently: Take 5 mg by mouth once a week.  ), Disp: 30 tablet, Rfl: 3    Allergies   Allergen Reactions   • Codeine      Other reaction(s): headache           Past Medical History:   Diagnosis Date   • Breast calcification, right    • Cystic acne    • Lipid disorder    • Night sweats    • Osteoporosis    • Squamous cell carcinoma of skin        Past Surgical History:   Procedure Laterality Date   • BREAST BIOPSY     • BREAST LUMPECTOMY         Family History   Problem Relation Age of Onset   • Lung cancer Biological Mother    • Breast cancer Biological Mother    • Hyperlipidemia Biological Mother    • Lung cancer Biological Father    • Heart disease Biological Sister    • Bipolar disorder Biological Brother    • Hyperlipidemia Biological Brother         Social History     Socioeconomic History   • Marital status:      Spouse name: None   • Number of children: None   • Years of education: None   • Highest education level: None   Occupational History   • None   Tobacco Use   • Smoking status: Never Smoker   • Smokeless tobacco: Former User   Vaping Use   • Vaping Use: Never used   Substance and Sexual Activity   • Alcohol use: Yes     Alcohol/week: 3.0 standard drinks     Types: 3 Glasses of wine per week   • Drug use: Never   • Sexual activity: Defer     Birth control/protection: Post-menopausal   Other Topics Concern   • None   Social History Narrative    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      Physical Exam  Vitals reviewed.   HENT:      Head: Normocephalic and atraumatic.      Right Ear: External ear normal.      Left Ear: External ear normal.   Eyes:      Conjunctiva/sclera: Conjunctivae normal.      Pupils: Pupils are equal, round, and reactive to light.   Neck:      Thyroid: No thyroid mass or thyromegaly.      Vascular: No carotid bruit or JVD.      Trachea: No tracheal deviation.   Cardiovascular:      Rate and Rhythm: Normal rate and regular rhythm.      Heart sounds: Normal heart sounds. No murmur heard.    No friction rub. No gallop.   Pulmonary:      Effort: Pulmonary effort is normal. No respiratory distress.      Breath sounds: Normal breath sounds. No wheezing or rales.   Abdominal:      General: Bowel sounds are normal. There is no  distension.      Palpations: Abdomen is soft.      Tenderness: There is no abdominal tenderness.   Musculoskeletal:         General: Normal range of motion.      Cervical back: Normal range of motion and neck supple.   Lymphadenopathy:      Cervical: No cervical adenopathy.   Skin:     General: Skin is warm and dry.   Neurological:      Mental Status: She is alert and oriented to person, place, and time.   Psychiatric:         Behavior: Behavior normal.         Thought Content: Thought content normal.         Assessment/Plan     Problem List Items Addressed This Visit        Digestive    Vitamin D deficiency       Musculoskeletal    Lumbar spondylolysis    Osteoporosis without current pathological fracture    Relevant Orders    TSH    Vitamin D 25 hydroxy       Endocrine/Metabolic    Lipid disorder     Life's Simple 7    1) never smoke or quit if you do.  2) try to strive for a Body Mass Index (BMI) of less than 25.  3) exercise at least 150min of moderate exercise weekly.  4) follow a Mediterranean style diet.  5) keep your total cholesterol less than 200.  6) keep your blood pressure below 120/80.  7) keep your fasting blood sugar less than 100.           Relevant Orders    CBC and differential    Comprehensive metabolic panel    Hemoglobin A1c    Lipid panel    TSH       Infectious/Inflammatory    Cystic acne       Other    Squamous cell skin cancer    Family history of heart disease    Elevated LDL cholesterol level    Primary insomnia    Family history of pancreatic cancer    Relevant Orders    Ambulatory referral to Genetics    Family history of lung cancer    Relevant Orders    Ambulatory referral to Genetics    Routine adult health maintenance - Primary     · Weight:  Achieve and maintain a healthy weight (Body Mass Index 18-25).  · Physical activity: Avoid inactivity and return to normal activities as soon as possible following diagnosis. Aim to exercise at least 150 minutes per week. Include  strength-training exercises at least 2 days per week.  · Nutrition: Consume a healthy plant-based diet. Avoid sugary drinks and limit consumption of energy-dense foods. Eat at least five portions a day of a variety of vegetables, fruits, whole grains, and legumes such as beans. Limit consumption of red meats and avoid processed meats.  · Alcohol: Limit alcoholic drinks to no more than two a day for men and one a day for women.  · Sun Exposure: Limit direct sun exposure, use sunscreen, get annual skin exam. Choose a sunscreen that is broad spectrum protecting against UVA and UVB rays, at least SPF 30, water resistant or very water resistant for up to 40 or 80 mins.   · Tobacco: If you use tobacco products, try to quit. If someone in your household smokes, encourage them to quit.  · Supplements:  Try to obtain nutrients from healthy foods; generally, you should not need a supplement except for specific health problems.  · Immunizations: Annual influenza vaccine/ other vaccines as we discussed today (pneumococcal and shingles).  Please let me know if you have any questions about these.    · General: Please follow up with your gyn, your dermatologist and your dentist, if you have not already done so. Do not text and drive.            I do recommend the covid 19 vaccine, if you have not had it. I am ready to discuss the benefits if you would like. You may be due for a booster vaccine 5-6 months after your second dose.            Please continue to wear a tight fitting mask when in public settings especially when you are indoors and to socially distance (at least 3-6 feet).  These measures may still give you extra protection against covid-19.

## 2022-01-13 NOTE — PATIENT INSTRUCTIONS
Problem List Items Addressed This Visit        Digestive    Vitamin D deficiency       Musculoskeletal    Lumbar spondylolysis    Osteoporosis without current pathological fracture    Relevant Orders    TSH    Vitamin D 25 hydroxy       Endocrine/Metabolic    Lipid disorder     Life's Simple 7    1) never smoke or quit if you do.  2) try to strive for a Body Mass Index (BMI) of less than 25.  3) exercise at least 150min of moderate exercise weekly.  4) follow a Mediterranean style diet.  5) keep your total cholesterol less than 200.  6) keep your blood pressure below 120/80.  7) keep your fasting blood sugar less than 100.           Relevant Orders    CBC and differential    Comprehensive metabolic panel    Hemoglobin A1c    Lipid panel    TSH       Infectious/Inflammatory    Cystic acne       Other    Squamous cell skin cancer    Family history of heart disease    Elevated LDL cholesterol level    Primary insomnia    Family history of pancreatic cancer    Relevant Orders    Ambulatory referral to Genetics    Family history of lung cancer    Relevant Orders    Ambulatory referral to Genetics    Routine adult health maintenance - Primary     · Weight:  Achieve and maintain a healthy weight (Body Mass Index 18-25).  · Physical activity: Avoid inactivity and return to normal activities as soon as possible following diagnosis. Aim to exercise at least 150 minutes per week. Include strength-training exercises at least 2 days per week.  · Nutrition: Consume a healthy plant-based diet. Avoid sugary drinks and limit consumption of energy-dense foods. Eat at least five portions a day of a variety of vegetables, fruits, whole grains, and legumes such as beans. Limit consumption of red meats and avoid processed meats.  · Alcohol: Limit alcoholic drinks to no more than two a day for men and one a day for women.  · Sun Exposure: Limit direct sun exposure, use sunscreen, get annual skin exam. Choose a sunscreen that is broad  spectrum protecting against UVA and UVB rays, at least SPF 30, water resistant or very water resistant for up to 40 or 80 mins.   · Tobacco: If you use tobacco products, try to quit. If someone in your household smokes, encourage them to quit.  · Supplements:  Try to obtain nutrients from healthy foods; generally, you should not need a supplement except for specific health problems.  · Immunizations: Annual influenza vaccine/ other vaccines as we discussed today (pneumococcal and shingles).  Please let me know if you have any questions about these.    · General: Please follow up with your gyn, your dermatologist and your dentist, if you have not already done so. Do not text and drive.            I do recommend the covid 19 vaccine, if you have not had it. I am ready to discuss the benefits if you would like. You may be due for a booster vaccine 5-6 months after your second dose.            Please continue to wear a tight fitting mask when in public settings especially when you are indoors and to socially distance (at least 3-6 feet).  These measures may still give you extra protection against covid-19.

## 2022-01-13 NOTE — ASSESSMENT & PLAN NOTE
· Weight:  Achieve and maintain a healthy weight (Body Mass Index 18-25).  · Physical activity: Avoid inactivity and return to normal activities as soon as possible following diagnosis. Aim to exercise at least 150 minutes per week. Include strength-training exercises at least 2 days per week.  · Nutrition: Consume a healthy plant-based diet. Avoid sugary drinks and limit consumption of energy-dense foods. Eat at least five portions a day of a variety of vegetables, fruits, whole grains, and legumes such as beans. Limit consumption of red meats and avoid processed meats.  · Alcohol: Limit alcoholic drinks to no more than two a day for men and one a day for women.  · Sun Exposure: Limit direct sun exposure, use sunscreen, get annual skin exam. Choose a sunscreen that is broad spectrum protecting against UVA and UVB rays, at least SPF 30, water resistant or very water resistant for up to 40 or 80 mins.   · Tobacco: If you use tobacco products, try to quit. If someone in your household smokes, encourage them to quit.  · Supplements:  Try to obtain nutrients from healthy foods; generally, you should not need a supplement except for specific health problems.  · Immunizations: Annual influenza vaccine/ other vaccines as we discussed today (pneumococcal and shingles).  Please let me know if you have any questions about these.    · General: Please follow up with your gyn, your dermatologist and your dentist, if you have not already done so. Do not text and drive.            I do recommend the covid 19 vaccine, if you have not had it. I am ready to discuss the benefits if you would like. You may be due for a booster vaccine 5-6 months after your second dose.            Please continue to wear a tight fitting mask when in public settings especially when you are indoors and to socially distance (at least 3-6 feet).  These measures may still give you extra protection against covid-19.

## 2022-01-13 NOTE — ASSESSMENT & PLAN NOTE
Life's Simple 7    1) never smoke or quit if you do.  2) try to strive for a Body Mass Index (BMI) of less than 25.  3) exercise at least 150min of moderate exercise weekly.  4) follow a Mediterranean style diet.  5) keep your total cholesterol less than 200.  6) keep your blood pressure below 120/80.  7) keep your fasting blood sugar less than 100.

## 2022-02-23 ENCOUNTER — TELEPHONE (OUTPATIENT)
Dept: GENETICS | Facility: HOSPITAL | Age: 62
End: 2022-02-23
Payer: COMMERCIAL

## 2022-02-23 NOTE — TELEPHONE ENCOUNTER
I called Silvia Billings to remind her of her 8:30 appointment tomorrow. I provided directions to the breast center. Silvia Billings confirmed that she will be present.

## 2022-02-24 ENCOUNTER — CLINICAL SUPPORT (OUTPATIENT)
Dept: GENETICS | Facility: HOSPITAL | Age: 62
End: 2022-02-24
Attending: INTERNAL MEDICINE
Payer: COMMERCIAL

## 2022-02-24 DIAGNOSIS — Z80.3 FHX: BREAST CANCER: ICD-10-CM

## 2022-02-24 DIAGNOSIS — Z71.83 ENCOUNTER FOR NONPROCREATIVE GENETIC COUNSELING: Primary | ICD-10-CM

## 2022-02-24 DIAGNOSIS — Z80.0 FAMILY HISTORY OF PANCREATIC CANCER: ICD-10-CM

## 2022-02-24 PROCEDURE — 96040 HC GENETICS COUNSELING SESSIONS: CPT | Performed by: GENETIC COUNSELOR, MS

## 2022-02-24 PROCEDURE — 36415 COLL VENOUS BLD VENIPUNCTURE: CPT | Performed by: GENETIC COUNSELOR, MS

## 2022-02-24 NOTE — PROGRESS NOTES
"Patient Name:  Silvia Billings  : 1960         Indication for Appointment:  Silvia Billings presented for genetic counseling and cancer risk assessment at [facility] due to a family history of breast, pancreatic and lung cancer. Silvia Billings was referred by Taylor Duque DO and presented to the session alone.    Personal History:   Silvia Billings is 61 y.o. female of Ashkenazi Pentecostal, Mexican and Japanese descent with primary visit diagnosis of Encounter for nonprocreative genetic counseling [Z71.83].    Past Medical History:   Diagnosis Date   • Breast calcification, right    • Cystic acne    • Lipid disorder    • Night sweats    • Osteoporosis    • Squamous cell carcinoma of skin       Past Medical History Pertinent Negatives:   Denies History Of: Date Noted   • Disease of thyroid gland 2022   • Fibroid 2022   • Melanoma (CMS/HCC) 2022     Past Surgical History:   Procedure Laterality Date   • Breast biopsy     • Breast lumpectomy     • Colonoscopy      no polyps per patient     Past Surgical History Pertinent Negatives:   Denies History Of: Date Noted   • Hysterectomy 2022   • Oophorectomy 2022   • Salpingectomy 2022      Height/Weight: (previously recorded at doctor's office)  Height: 1.575 m (5' 2\")  Weight: 50 kg (110 lb 3.2 oz)    Gynecologic History:  Menarche Age: 11 years  Age at first live birth: 29  Menopause Status: Post-Menopause  Age at menopause: 51  Use of hormonal contraceptives: Yes (BCPs >5 years)  Use of fertility medications: Yes (1 month clomid)  Use of hormone replacement therapy: No  Use of Tamoxifen/Evista: No    Social History     Tobacco Use   • Smoking status: Never Smoker   • Smokeless tobacco: Never Used   Vaping Use   • Vaping Use: Never used   Substance Use Topics   • Alcohol use: Yes     Alcohol/week: 3.0 standard drinks     Types: 3 Glasses of wine per week   • Drug use: Never       Family History:  See completed family history in " pedigree below.    Genetic Education/Risk Assessment/Counseling:  Information was provided about the relationship between genes and cancer.  The concept of hereditary cancer was defined.  Natural history, risks and inheritance patterns of  cancer-associated genes were reviewed, as related to Silvia Billings’s personal and/or family history.  Related psychosocial aspects were discussed.    Discussion of Genetic Testing:  The pros, cons, and limitations of testing for genetic susceptibility were discussed, including but not limited to test options, possible results, potential impact on management, and psychosocial aspects.  There may be limited data on the degree of cancer risk and/or no defined management guidelines associated with some genes.  If applicable, risk assessment models and/or published tables were used to provide a mutation probability estimate. Limitations of assessment were reviewed.    Given the reported personal and/or family history, genetic testing was offered and accepted. The following testing was ordered:    [facility]   Breast Cancer Panel  Colorectal Cancers Panel  Pancreatic Cancers Panel  Renal / Urinary Tract Panel  EGFR/ ERBB1 Analysis      Plan:  Silvia Billings was confirmed to have understood the aforementioned information and was assisted with decision making as needed.  Informational and supportive resources were provided. Consent was obtained to share chart note(s) with physicians. Silvia Billings is encouraged to contact the program with personal/family history updates. Silvia Billings will be contacted via telephone when genetic test results are available.     A total of 30 minutes was spent providing genetic counseling to Silvia Billings.

## 2022-02-24 NOTE — LETTER
02/24/22    Taylor Duque, DO  [address]  [address]  [place] [facility] [place] [address]      Dear Dr. Duque,    I am writing to confirm that your patient, Silvia Billings, received care through my office on 02/24/22. I have enclosed a summary of the care provided to Silvia for your reference.    Please contact me with any questions you may have regarding the visit.    Sincerely,           MEGAN Christopher      CC: Pito Zapata MD

## 2022-02-24 NOTE — LETTER
02/24/22    Silvia Billings  [address] [place] [address]    Dear Ms. Billings,    Thank you for participating in the [facility] Program.  It was a pleasure working with you. Attached is documentation from our discussion(s). It will also be sent to any physicians you indicated.      You may also choose to share this documentation with your family members. Because cancer risk is based on both personal and both maternal and paternal family history factors, as well as mutation status, your relatives are recommended to review their risks and genetic testing options (if applicable) with their own healthcare providers to derive a risk-appropriate, individualized plan.      If you have any questions, concerns, or updates to your personal/family history, please contact the [facility] Risk Assessment and Genetics Program at [phone] to further review your case.    Please see below for your encounter report.    Sincerely,         Ting Gudino [facility]        Encounter Note    Indication for Appointment:  Silvia Billings presented for genetic counseling and cancer risk assessment at [facility] due to a family history of breast, pancreatic and lung cancer. Silvia Billings was referred by Taylor Duque DO and presented to the session alone.    Personal History:   Silvia Billings is 61 y.o. female of Ashkenazi Lutheran, [place]ian and Brazilian descent with primary visit diagnosis of Encounter for nonprocreative genetic counseling [Z71.83].    Past Medical History:   Diagnosis Date   • Breast calcification, right    • Cystic acne    • Lipid disorder    • Night sweats    • Osteoporosis    • Squamous cell carcinoma of skin       Past Medical History Pertinent Negatives:   Denies History Of: Date Noted   • Disease of thyroid gland 02/24/2022   • Fibroid 02/24/2022   • Melanoma (CMS/HCC) 02/24/2022     Past Surgical History:   Procedure Laterality Date   • Breast biopsy     • Breast  "lumpectomy     • Colonoscopy  2020    no polyps per patient     Past Surgical History Pertinent Negatives:   Denies History Of: Date Noted   • Hysterectomy 02/24/2022   • Oophorectomy 02/24/2022   • Salpingectomy 02/24/2022      Height/Weight: (previously recorded at doctor's office)  Height: 1.575 m (5' 2\")  Weight: 50 kg (110 lb 3.2 oz)    Gynecologic History:  Menarche Age: 11 years  Age at first live birth: 29  Menopause Status: Post-Menopause  Age at menopause: 51  Use of hormonal contraceptives: Yes (BCPs >5 years)  Use of fertility medications: Yes (1 month clomid)  Use of hormone replacement therapy: No  Use of Tamoxifen/Evista: No    Social History     Tobacco Use   • Smoking status: Never Smoker   • Smokeless tobacco: Never Used   Vaping Use   • Vaping Use: Never used   Substance Use Topics   • Alcohol use: Yes     Alcohol/week: 3.0 standard drinks     Types: 3 Glasses of wine per week   • Drug use: Never       Family History:  See completed family history in pedigree below.    Genetic Education/Risk Assessment/Counseling:  Information was provided about the relationship between genes and cancer.  The concept of hereditary cancer was defined.  Natural history, risks and inheritance patterns of  cancer-associated genes were reviewed, as related to Silvia Billings’s personal and/or family history.  Related psychosocial aspects were discussed.    Discussion of Genetic Testing:  The pros, cons, and limitations of testing for genetic susceptibility were discussed, including but not limited to test options, possible results, potential impact on management, and psychosocial aspects.  There may be limited data on the degree of cancer risk and/or no defined management guidelines associated with some genes.  If applicable, risk assessment models and/or published tables were used to provide a mutation probability estimate. Limitations of assessment were reviewed.    Given the reported personal and/or family history, " genetic testing was offered and accepted. The following testing was ordered:    [facility]   Breast Cancer Panel  Colorectal Cancers Panel  Pancreatic Cancers Panel  Renal / Urinary Tract Panel  EGFR/ ERBB1 Analysis      Plan:  Silvia Billings was confirmed to have understood the aforementioned information and was assisted with decision making as needed.  Informational and supportive resources were provided. Consent was obtained to share chart note(s) with physicians. Silvia Billings is encouraged to contact the program with personal/family history updates. Silvia Billings will be contacted via telephone when genetic test results are available.     A total of 30 minutes was spent providing genetic counseling to Silvia Billings.

## 2022-03-02 ENCOUNTER — APPOINTMENT (RX ONLY)
Dept: URBAN - METROPOLITAN AREA CLINIC 374 | Facility: CLINIC | Age: 62
Setting detail: DERMATOLOGY
End: 2022-03-02

## 2022-03-02 DIAGNOSIS — L24 IRRITANT CONTACT DERMATITIS: ICD-10-CM

## 2022-03-02 DIAGNOSIS — L30.8 OTHER SPECIFIED DERMATITIS: ICD-10-CM

## 2022-03-02 PROBLEM — L24.9 IRRITANT CONTACT DERMATITIS, UNSPECIFIED CAUSE: Status: ACTIVE | Noted: 2022-03-02

## 2022-03-02 PROCEDURE — ? PRESCRIPTION

## 2022-03-02 PROCEDURE — 99203 OFFICE O/P NEW LOW 30 MIN: CPT

## 2022-03-02 PROCEDURE — ? COUNSELING

## 2022-03-02 PROCEDURE — ? PRESCRIPTION MEDICATION MANAGEMENT

## 2022-03-02 RX ORDER — HYDROCORTISONE 25 MG/G
CREAM TOPICAL BID
Qty: 30 | Refills: 3 | Status: ERX | COMMUNITY
Start: 2022-03-02

## 2022-03-02 RX ORDER — TRIAMCINOLONE ACETONIDE 1 MG/G
CREAM TOPICAL BID
Qty: 80 | Refills: 3 | Status: ERX | COMMUNITY
Start: 2022-03-02

## 2022-03-02 RX ADMIN — HYDROCORTISONE: 25 CREAM TOPICAL at 00:00

## 2022-03-02 RX ADMIN — TRIAMCINOLONE ACETONIDE: 1 CREAM TOPICAL at 00:00

## 2022-03-02 ASSESSMENT — LOCATION DETAILED DESCRIPTION DERM: LOCATION DETAILED: LEFT MEDIAL MALAR CHEEK

## 2022-03-02 ASSESSMENT — LOCATION SIMPLE DESCRIPTION DERM: LOCATION SIMPLE: LEFT CHEEK

## 2022-03-02 ASSESSMENT — LOCATION ZONE DERM: LOCATION ZONE: FACE

## 2022-03-02 NOTE — PROCEDURE: PRESCRIPTION MEDICATION MANAGEMENT
Detail Level: Zone
Initiate Treatment: hydrocortisone 2.5 % topical cream :Apply to AA of the face bid
Render In Strict Bullet Format?: No
Initiate Treatment: triamcinolone acetonide 0.1 % topical cream :Apply BID to rash of trunk

## 2022-03-07 ENCOUNTER — TELEPHONE (OUTPATIENT)
Dept: GENETICS | Age: 62
End: 2022-03-07
Payer: COMMERCIAL

## 2022-03-07 LAB
ABNORMALITY: NORMAL
LYMPH SUBSET INTERP BLD FC-IMP: NORMAL
SCAN RESULT: NORMAL

## 2022-03-07 NOTE — LETTER
03/07/22    Silvia Billings  [address] [place] [address]    Dear Ms. Billings,    Thank you for participating in the [facility].  It was a pleasure working with you. Attached is documentation from our discussion(s). It will also be sent to any physicians you indicated.      You may also choose to share this documentation with your family members. Because cancer risk is based on both personal and both maternal and paternal family history factors, as well as mutation status, your relatives are recommended to review their risks and genetic testing options (if applicable) with their own healthcare providers to derive a risk-appropriate, individualized plan.      If you have any questions, concerns, or updates to your personal/family history, please contact the [facility] Risk Assessment and Genetics Program at [phone] to further review your case.    Please see below for your encounter report.    Sincerely,         Ting Gudino, [facility]        Encounter Note           Indication for Appointment:  Silvia Billings was seen by the Cancer Risk Assessment and Genetics Program at [facility] due to family history of breast, pancreatic, and lung cancer. Genetic testing was performed.    Silvia Billings was contacted by telephone today to discuss genetic test results, risk-based management guidelines and any potential additional test options. Follow up appointments to discuss the results in more detail with our medical director may be scheduled by contacting the Cancer Risk Assessment and Genetics Program.    Genetic Test Results:    RESULT:    Negative- No Pathogenic Sequence Variants Identified     LAB/TEST:  [facility]  Breast Cancer Panel  Colorectal Cancers Panel  Pancreatic Cancers Panel  Renal / Urinary Tract Panel  EGFR/ ERBB1 Analysis    Genes analyzed: APC*, MEÑO*, AXIN2, BAP1, BARD1, BMPR1A, BRCA1, BRCA2, CDC73, CDH1, CDKN1C, CDKN2A (p14ARF), CDKN2A  "([id number]), CHEK2, DICER1*, DIS3L2, EGFR, EPCAM*, ERBB2*, FH*, FLCN, GPC3*, GREM1*, MEN1*, MET*, MLH1*, MSH2*, MSH3*, MSH6*, MUTYH, NF1*, NTHL1, PALB2, PMS2*, POLD1*, POLE, PTEN*, RAD51C, RAD51D, REST, SDHB, SDHC*, SMAD4, SMARCA4, SMARCB1, STK11, TP53, TSC1*, TSC2, VHL, WT1    After receiving consent, the following result(s) were disclosed to Silvia Billings:   - No reportable alterations were identified by sequencing and/or deletion/duplication analysis as interpreted by this laboratory.  This is referred to as an indeterminate negative result.  A mutation could be present that cannot be detected by the current tests performed or in a gene not tested. Additionally, biological family members may have a mutation in any of the genes tested Silvia Billings does not given the negative result.      Personal History:   Silvia Billings is 61 y.o. female of Ashkenazi Jehovah's witness, [place] and Malay descent.     Past Medical History:   Diagnosis Date   • Breast calcification, right    • Cystic acne    • Lipid disorder    • Night sweats    • Osteoporosis    • Squamous cell carcinoma of skin      Past Medical History Pertinent Negatives:   Denies History Of: Date Noted   • Disease of thyroid gland 02/24/2022   • Fibroid 02/24/2022   • Melanoma (CMS/HCC) 02/24/2022     Past Surgical History:   Procedure Laterality Date   • Breast biopsy     • Breast lumpectomy     • Colonoscopy  2020    no polyps per patient     Past Surgical History Pertinent Negatives:   Denies History Of: Date Noted   • Hysterectomy 02/24/2022   • Oophorectomy 02/24/2022   • Salpingectomy 02/24/2022        Height/Weight: (previously recorded at doctor's office)  Height: 1.575 m (5' 2\")  Weight: 50 kg (110 lb 3.2 oz)     Gynecologic History:  Menarche Age: 11 years  Age at first live birth: 29  Menopause Status: Post-Menopause  Age at menopause: 51  Use of hormonal contraceptives: Yes (BCPs >5 years)  Use of fertility medications: Yes (1 month clomid)  Use of hormone " replacement therapy: No  Use of Tamoxifen/Evista: No      Social History     Tobacco Use   • Smoking status: Never Smoker   • Smokeless tobacco: Never Used   Vaping Use   • Vaping Use: Never used   Substance Use Topics   • Alcohol use: Yes     Alcohol/week: 3.0 standard drinks     Types: 3 Glasses of wine per week   • Drug use: Never       Family History:  See completed family history in pedigree below.      Risk Assessment and Management:     As a clinically actionable mutation was not identified by the current test method(s), the cancer risks and guidelines reviewed are based on the personal and/or family history provided. As guidelines continually change and the efficacy of screening for some cancers remains under investigation, Silvia Billings is encouraged to review personal and family history with managing physician(s) regularly.    Site of Surveillence Coordinating Provider Recommendation Status   Breast Primary Care or Gynecology · Lifetime risk of developing breast cancer was calculated using the Tyrer-Cuzick model and is estimated to be that of the general population.  · Breast cancer risk is dynamic and can increase with age and other factors. A high lifetime risk for developing breast cancer is typically 20-25% or higher.  National Comprehensive Cancer Network (NCCN) guidelines for breast cancer screening include: monthly self breast examinations, clinical breast examination performed by a physician every 12 months and annual mammogram beginning by age 40.    Gynecologic Gynecology · Pelvic exam and pap test annually or as directed by physician.       Gastrointestinal PCP  Gastroenterology · Silvia Billings is advised to continue with colorectal cancer screening as directed by physician, or sooner if symptoms or changes in history warrant sooner evaluation.   · Based on the reported family history of colorectal cancer, Silvia Billings’s lifetime risk of developing colorectal cancer is increased 1.5 fold over that of  the general population.  General population risk is approximately 5-6%; thus, Silvia Billings’s risk is 7.5-9%. This may be an underestimation of risk as the average colorectal cancer risk among individuals of Ashkenazi Confucianist descent has been reported to be up to 10-12%. The patient was encouraged to review this history and risk with managing gastroenterologist to determine frequency of colonoscopy.      Skin PCP  Dermatology · Silvia Billings is encouraged to practice skin protective behaviors, such as:  · Limit direct sun exposure  · Use sunscreen  · Pursue annual skin screening by a physician         General Management PCP · Annual physical examination is encouraged.  · Adherence to a healthy lifestyle, including body mass index (BMI) <25 obtained through balanced diet and exercise  · Limit intake of alcoholic beverages to less than 1 drink per day (serving equals 1 ounce of liquor, 6 ounces of wine or 8 ounces of beer)  · Not smoking.        Plan:  Cancer risks are based on personal history, as well as on maternal and paternal family histories, mutation status, and other factors.  Relatives are encouraged to consider risk assessment and/or genetic evaluation to derive a risk-appropriate, individualized plan.  Should family member(s) be interested in genetic evaluation, the Cancer Risk Assessment and Genetics Program can provide consultation or help to find a genetic counselor in their area.    The information provided reflects current practice guidelines and may change with new medical discoveries/technology/updated personal or family history information.  Silvia Billings was confirmed to have understood the aforementioned information and was assisted with decision making as needed.  Informational and supportive resources were provided.  Potential psychosocial ramifications related to test results were reviewed.  Consent was obtained to share chart note(s) with physicians.  Silvia Billings plans to discuss the above  information with physicians to determine an optimal risk management plan.    Silvia Billings should contact the program with personal/family history updates as this could alter the guidelines provided and/or available test options and/or to inquire about new information specific to this case.   As stated, there may be other genes associated with cancer risk for which Silvia Billings was not tested.  Silvia Billings was encouraged to contact the genetics program at [phone] with any questions or concerns and/or to periodically review test options and related insurance coverage.

## 2022-03-07 NOTE — TELEPHONE ENCOUNTER
Patient Name:  Silvia Billings  : 1960           Indication for Appointment:  Silvia Billings was seen by the Cancer Risk Assessment and Genetics Program at [facility] due to family history of breast, pancreatic, and lung cancer. Genetic testing was performed.    Silvia Billings was contacted by telephone today to discuss genetic test results, risk-based management guidelines and any potential additional test options. Follow up appointments to discuss the results in more detail with our medical director may be scheduled by contacting the Cancer Risk Assessment and Genetics Program.    Genetic Test Results:    RESULT:    Negative- No Pathogenic Sequence Variants Identified     LAB/TEST:  [facility]  Breast Cancer Panel  Colorectal Cancers Panel  Pancreatic Cancers Panel  Renal / Urinary Tract Panel  EGFR/ ERBB1 Analysis    Genes analyzed: APC*, MEÑO*, AXIN2, BAP1, BARD1, BMPR1A, BRCA1, BRCA2, CDC73, CDH1, CDKN1C, CDKN2A (p14ARF), CDKN2A ([id number]), CHEK2, DICER1*, DIS3L2, EGFR, EPCAM*, ERBB2*, FH*, FLCN, GPC3*, GREM1*, MEN1*, MET*, MLH1*, MSH2*, MSH3*, MSH6*, MUTYH, NF1*, NTHL1, PALB2, PMS2*, POLD1*, POLE, PTEN*, RAD51C, RAD51D, REST, SDHB, SDHC*, SMAD4, SMARCA4, SMARCB1, STK11, TP53, TSC1*, TSC2, VHL, WT1    After receiving consent, the following result(s) were disclosed to Silvia Billings:   - No reportable alterations were identified by sequencing and/or deletion/duplication analysis as interpreted by this laboratory.  This is referred to as an indeterminate negative result.  A mutation could be present that cannot be detected by the current tests performed or in a gene not tested. Additionally, biological family members may have a mutation in any of the genes tested Silvia Billings does not given the negative result.      Personal History:   Silvia Billings is 61 y.o. female of Ashkenazi Jainism, [place] and [place] descent.     Past Medical History:   Diagnosis Date   • Breast calcification, right    • Cystic acne    •  "Lipid disorder    • Night sweats    • Osteoporosis    • Squamous cell carcinoma of skin      Past Medical History Pertinent Negatives:   Denies History Of: Date Noted   • Disease of thyroid gland 02/24/2022   • Fibroid 02/24/2022   • Melanoma (CMS/HCC) 02/24/2022     Past Surgical History:   Procedure Laterality Date   • Breast biopsy     • Breast lumpectomy     • Colonoscopy  2020    no polyps per patient     Past Surgical History Pertinent Negatives:   Denies History Of: Date Noted   • Hysterectomy 02/24/2022   • Oophorectomy 02/24/2022   • Salpingectomy 02/24/2022        Height/Weight: (previously recorded at doctor's office)  Height: 1.575 m (5' 2\")  Weight: 50 kg (110 lb 3.2 oz)     Gynecologic History:  Menarche Age: 11 years  Age at first live birth: 29  Menopause Status: Post-Menopause  Age at menopause: 51  Use of hormonal contraceptives: Yes (BCPs >5 years)  Use of fertility medications: Yes (1 month clomid)  Use of hormone replacement therapy: No  Use of Tamoxifen/Evista: No      Social History     Tobacco Use   • Smoking status: Never Smoker   • Smokeless tobacco: Never Used   Vaping Use   • Vaping Use: Never used   Substance Use Topics   • Alcohol use: Yes     Alcohol/week: 3.0 standard drinks     Types: 3 Glasses of wine per week   • Drug use: Never       Family History:  See completed family history in pedigree below.      Risk Assessment and Management:     As a clinically actionable mutation was not identified by the current test method(s), the cancer risks and guidelines reviewed are based on the personal and/or family history provided. As guidelines continually change and the efficacy of screening for some cancers remains under investigation, Silvia Billings is encouraged to review personal and family history with managing physician(s) regularly.    Site of Surveillence Coordinating Provider Recommendation Status   Breast Primary Care or Gynecology · Lifetime risk of developing breast cancer was " calculated using the Tyrer-Cuzick model and is estimated to be that of the general population.  · Breast cancer risk is dynamic and can increase with age and other factors. A high lifetime risk for developing breast cancer is typically 20-25% or higher.  National Comprehensive Cancer Network (NCCN) guidelines for breast cancer screening include: monthly self breast examinations, clinical breast examination performed by a physician every 12 months and annual mammogram beginning by age 40.    Gynecologic Gynecology · Pelvic exam and pap test annually or as directed by physician.       Gastrointestinal PCP  Gastroenterology · Silvia Billings is advised to continue with colorectal cancer screening as directed by physician, or sooner if symptoms or changes in history warrant sooner evaluation.   · Based on the reported family history of colorectal cancer, Silvia Billings’s lifetime risk of developing colorectal cancer is increased 1.5 fold over that of the general population.  General population risk is approximately 5-6%; thus, Silvia Billings’s risk is 7.5-9%. This may be an underestimation of risk as the average colorectal cancer risk among individuals of Ashkenazi Jainism descent has been reported to be up to 10-12%. The patient was encouraged to review this history and risk with managing gastroenterologist to determine frequency of colonoscopy.      Skin PCP  Dermatology · Silvia Billings is encouraged to practice skin protective behaviors, such as:  · Limit direct sun exposure  · Use sunscreen  · Pursue annual skin screening by a physician         General Management PCP · Annual physical examination is encouraged.  · Adherence to a healthy lifestyle, including body mass index (BMI) <25 obtained through balanced diet and exercise  · Limit intake of alcoholic beverages to less than 1 drink per day (serving equals 1 ounce of liquor, 6 ounces of wine or 8 ounces of beer)  · Not smoking.        Plan:  Cancer risks are based on personal  history, as well as on maternal and paternal family histories, mutation status, and other factors.  Relatives are encouraged to consider risk assessment and/or genetic evaluation to derive a risk-appropriate, individualized plan.  Should family member(s) be interested in genetic evaluation, the Cancer Risk Assessment and Genetics Program can provide consultation or help to find a genetic counselor in their area.    The information provided reflects current practice guidelines and may change with new medical discoveries/technology/updated personal or family history information.  Silvia Billings was confirmed to have understood the aforementioned information and was assisted with decision making as needed.  Informational and supportive resources were provided.  Potential psychosocial ramifications related to test results were reviewed.  Consent was obtained to share chart note(s) with physicians.  Silvia Billings plans to discuss the above information with physicians to determine an optimal risk management plan.    Silvia Billings should contact the program with personal/family history updates as this could alter the guidelines provided and/or available test options and/or to inquire about new information specific to this case.   As stated, there may be other genes associated with cancer risk for which Silvia Billings was not tested.  Silvia Billings was encouraged to contact the genetics program at [phone] with any questions or concerns and/or to periodically review test options and related insurance coverage.

## 2022-03-07 NOTE — LETTER
03/07/22    Taylor Duque, DO  [address]  [address]  [place] [address]      Dear Dr. Duque,    I am writing to confirm that your patient, Silvia Billings, received care through my office on 03/07/22. I have enclosed a summary of the care provided to Silvia for your reference.    Please contact me with any questions you may have regarding the visit.    Sincerely,           MEGAN Christopher      CC: Pito Zapata MD

## 2022-04-13 ENCOUNTER — APPOINTMENT (RX ONLY)
Dept: URBAN - METROPOLITAN AREA CLINIC 374 | Facility: CLINIC | Age: 62
Setting detail: DERMATOLOGY
End: 2022-04-13

## 2022-04-13 DIAGNOSIS — L73.8 OTHER SPECIFIED FOLLICULAR DISORDERS: ICD-10-CM

## 2022-04-13 DIAGNOSIS — L30.8 OTHER SPECIFIED DERMATITIS: ICD-10-CM

## 2022-04-13 DIAGNOSIS — L24 IRRITANT CONTACT DERMATITIS: ICD-10-CM

## 2022-04-13 PROBLEM — L24.9 IRRITANT CONTACT DERMATITIS, UNSPECIFIED CAUSE: Status: ACTIVE | Noted: 2022-04-13

## 2022-04-13 PROBLEM — L29.9 PRURITUS, UNSPECIFIED: Status: ACTIVE | Noted: 2022-04-13

## 2022-04-13 PROCEDURE — 99213 OFFICE O/P EST LOW 20 MIN: CPT | Mod: 25

## 2022-04-13 PROCEDURE — ? COUNSELING

## 2022-04-13 PROCEDURE — ? PHOTO-DOCUMENTATION

## 2022-04-13 PROCEDURE — ? PRESCRIPTION

## 2022-04-13 PROCEDURE — ? BIOPSY BY PUNCH METHOD

## 2022-04-13 PROCEDURE — ? PRESCRIPTION MEDICATION MANAGEMENT

## 2022-04-13 PROCEDURE — 11104 PUNCH BX SKIN SINGLE LESION: CPT

## 2022-04-13 RX ORDER — CLOTRIMAZOLE AND BETAMETHASONE DIPROPIONATE 10; .5 MG/G; MG/G
1 CREAM TOPICAL BID
Qty: 45 | Refills: 2 | Status: ERX | COMMUNITY
Start: 2022-04-13

## 2022-04-13 RX ADMIN — CLOTRIMAZOLE AND BETAMETHASONE DIPROPIONATE 1: 10; .5 CREAM TOPICAL at 00:00

## 2022-04-13 ASSESSMENT — SEVERITY ASSESSMENT 2020: SEVERITY 2020: ALMOST CLEAR

## 2022-04-13 ASSESSMENT — LOCATION SIMPLE DESCRIPTION DERM
LOCATION SIMPLE: ABDOMEN
LOCATION SIMPLE: RIGHT CHEEK
LOCATION SIMPLE: LEFT CHEEK

## 2022-04-13 ASSESSMENT — LOCATION DETAILED DESCRIPTION DERM
LOCATION DETAILED: LEFT CENTRAL MALAR CHEEK
LOCATION DETAILED: LEFT INFERIOR CENTRAL MALAR CHEEK
LOCATION DETAILED: RIGHT LATERAL ABDOMEN
LOCATION DETAILED: RIGHT INFERIOR MEDIAL MALAR CHEEK

## 2022-04-13 ASSESSMENT — LOCATION ZONE DERM
LOCATION ZONE: TRUNK
LOCATION ZONE: FACE

## 2022-04-13 NOTE — PROCEDURE: PRESCRIPTION MEDICATION MANAGEMENT
Detail Level: Simple
Initiate Treatment: Restart hydrocortisone 2.5 % cream, Apply to AA of the face bid until clear then decrease to QDAY
Render In Strict Bullet Format?: No
Discontinue Regimen: triamcinolone acetonide 0.1 % topical cream
Initiate Treatment: Clotrimazol-betamethasone cream :Apply BID to rash of trunk
Detail Level: Zone

## 2022-04-13 NOTE — PROCEDURE: BIOPSY BY PUNCH METHOD
Detail Level: Detailed
Was A Bandage Applied: Yes
Punch Size In Mm: 2.5
Biopsy Type: H and E
Anesthesia Type: 1% lidocaine with epinephrine
Anesthesia Volume In Cc (Will Not Render If 0): 0.4
Additional Anesthesia Volume In Cc (Will Not Render If 0): 0
Hemostasis: Pro QR topical powder
Epidermal Sutures: none, closed by secondary intention
Wound Care: Petrolatum
Dressing: bandage
Patient Will Remove Sutures At Home?: No
Consent: Written consent was obtained and risks were reviewed including but not limited to scarring, infection, bleeding, scabbing, incomplete removal, nerve damage and allergy to anesthesia.
Post-Care Instructions: I reviewed with the patient in detail post-care instructions. Patient is to keep the biopsy site dry overnight, and then apply bacitracin twice daily until healed. Patient may apply hydrogen peroxide soaks to remove any crusting.
Home Suture Removal Text: Patient was provided a home suture removal kit and will remove their sutures at home.  If they have any questions or difficulties they will call the office.
Notification Instructions: Patient will be notified of biopsy results. However, patient instructed to call the office if not contacted within 2 weeks.
Billing Type: Third-Party Bill
Information: Selecting Yes will display possible errors in your note based on the variables you have selected. This validation is only offered as a suggestion for you. PLEASE NOTE THAT THE VALIDATION TEXT WILL BE REMOVED WHEN YOU FINALIZE YOUR NOTE. IF YOU WANT TO FAX A PRELIMINARY NOTE YOU WILL NEED TO TOGGLE THIS TO 'NO' IF YOU DO NOT WANT IT IN YOUR FAXED NOTE.

## 2023-01-19 ENCOUNTER — OFFICE VISIT (OUTPATIENT)
Dept: INTERNAL MEDICINE | Facility: CLINIC | Age: 63
End: 2023-01-19
Payer: COMMERCIAL

## 2023-01-19 VITALS
WEIGHT: 110.8 LBS | SYSTOLIC BLOOD PRESSURE: 98 MMHG | HEIGHT: 61 IN | BODY MASS INDEX: 20.92 KG/M2 | HEART RATE: 71 BPM | TEMPERATURE: 98.1 F | DIASTOLIC BLOOD PRESSURE: 63 MMHG | OXYGEN SATURATION: 99 % | RESPIRATION RATE: 16 BRPM

## 2023-01-19 DIAGNOSIS — M81.0 AGE-RELATED OSTEOPOROSIS WITHOUT CURRENT PATHOLOGICAL FRACTURE: ICD-10-CM

## 2023-01-19 DIAGNOSIS — E78.9 LIPID DISORDER: ICD-10-CM

## 2023-01-19 DIAGNOSIS — Z80.1 FAMILY HISTORY OF LUNG CANCER: ICD-10-CM

## 2023-01-19 DIAGNOSIS — E78.00 ELEVATED LDL CHOLESTEROL LEVEL: ICD-10-CM

## 2023-01-19 DIAGNOSIS — Z00.00 ROUTINE ADULT HEALTH MAINTENANCE: Primary | ICD-10-CM

## 2023-01-19 DIAGNOSIS — M43.06 LUMBAR SPONDYLOLYSIS: ICD-10-CM

## 2023-01-19 DIAGNOSIS — E55.9 VITAMIN D DEFICIENCY: ICD-10-CM

## 2023-01-19 DIAGNOSIS — C44.92 SQUAMOUS CELL SKIN CANCER: ICD-10-CM

## 2023-01-19 DIAGNOSIS — L21.9 SEBORRHEA: ICD-10-CM

## 2023-01-19 DIAGNOSIS — Z80.0 FAMILY HISTORY OF PANCREATIC CANCER: ICD-10-CM

## 2023-01-19 PROBLEM — R53.83 OTHER FATIGUE: Status: RESOLVED | Noted: 2021-09-23 | Resolved: 2023-01-19

## 2023-01-19 PROCEDURE — 99396 PREV VISIT EST AGE 40-64: CPT | Performed by: INTERNAL MEDICINE

## 2023-01-19 PROCEDURE — 3008F BODY MASS INDEX DOCD: CPT | Performed by: INTERNAL MEDICINE

## 2023-01-19 RX ORDER — ESTRADIOL 10 UG/1
10 INSERT VAGINAL 2 TIMES WEEKLY
COMMUNITY
Start: 2023-01-09 | End: 2025-02-10

## 2023-01-19 RX ORDER — VALACYCLOVIR HYDROCHLORIDE 1 G/1
TABLET, FILM COATED ORAL
COMMUNITY
Start: 2022-11-26

## 2023-01-19 RX ORDER — KETOCONAZOLE 20 MG/G
CREAM TOPICAL DAILY
Qty: 30 G | Refills: 1 | Status: SHIPPED | OUTPATIENT
Start: 2023-01-19 | End: 2023-02-18

## 2023-01-19 RX ORDER — PROPYLENE GLYCOL 0.06 MG/ML
2 SOLUTION/ DROPS OPHTHALMIC 3 TIMES DAILY
COMMUNITY
Start: 2023-01-19 | End: 2025-02-10

## 2023-01-19 ASSESSMENT — PAIN SCALES - GENERAL: PAINLEVEL: 0-NO PAIN

## 2023-01-19 ASSESSMENT — ENCOUNTER SYMPTOMS
HEADACHES: 0
FREQUENCY: 0
COUGH: 0
SHORTNESS OF BREATH: 0
APPETITE CHANGE: 0
VOMITING: 0
POLYDIPSIA: 0
SLEEP DISTURBANCE: 0
ABDOMINAL PAIN: 0
BLOOD IN STOOL: 0
CHEST TIGHTNESS: 0
NAUSEA: 0
POLYPHAGIA: 0
DIFFICULTY URINATING: 0
UNEXPECTED WEIGHT CHANGE: 0
ACTIVITY CHANGE: 0
PALPITATIONS: 0
FATIGUE: 0
NERVOUS/ANXIOUS: 0
DYSPHORIC MOOD: 0
CONSTIPATION: 0
DIARRHEA: 0
DIZZINESS: 0

## 2023-01-19 ASSESSMENT — PATIENT HEALTH QUESTIONNAIRE - PHQ9: SUM OF ALL RESPONSES TO PHQ9 QUESTIONS 1 & 2: 0

## 2023-01-19 NOTE — PATIENT INSTRUCTIONS
Problem List Items Addressed This Visit       Lumbar spondylolysis    Squamous cell skin cancer    Relevant Medications    ketoconazole (NIZORAL) 2 % cream    Osteoporosis without current pathological fracture    Elevated LDL cholesterol level    Family history of pancreatic cancer    Family history of lung cancer    Vitamin D deficiency    Relevant Orders    Vitamin D 25 hydroxy    Routine adult health maintenance - Primary     Weight:  Achieve and maintain a healthy weight (Body Mass Index 18-25).  Physical activity: Avoid inactivity and return to normal activities as soon as possible following diagnosis. Aim to exercise at least 150 minutes per week. Include strength-training exercises at least 2 days per week.  Nutrition: Consume a healthy plant-based diet. Avoid sugary drinks and limit consumption of energy-dense foods. Eat at least five portions a day of a variety of vegetables, fruits, whole grains, and legumes such as beans. Limit consumption of red meats and avoid processed meats.  Alcohol: Limit alcoholic drinks to no more than two a day for men and one a day for women.  Sun Exposure: Limit direct sun exposure, use sunscreen, get annual skin exam. Choose a sunscreen that is broad spectrum protecting against UVA and UVB rays, at least SPF 30, water resistant or very water resistant for up to 40 or 80 mins.   Tobacco: If you use tobacco products, try to quit. If someone in your household smokes, encourage them to quit.  Supplements:  Try to obtain nutrients from healthy foods; generally, you should not need a supplement except for specific health problems.  Immunizations: Annual influenza vaccine/ other vaccines that you qualify for.  Please let me know if you have any questions about these.    General: Please follow up with your gyn, your dermatologist and your dentist, if you have not already done so. Do not text and drive.            I do recommend the covid 19 vaccine, if you have not had it. I am ready  to discuss the benefits if you would like. You may be due for a  1st booster 5-6 months after your second vaccine    and/or 2nd booster vaccine 4-5 months after your 1st booster if you are over 50 years old.            Please continue to wear a tight fitting mask when in public settings especially when you are indoors.  This may still give you additional protection against covid-19.  The new covid vaccine is now available.  Please send me a [facility] message if you have any questions.           Lipid disorder    Relevant Orders    CBC and Differential    Comprehensive metabolic panel    Lipid panel    TSH 3rd Generation    Seborrhea    Relevant Medications    ketoconazole (NIZORAL) 2 % cream

## 2023-01-19 NOTE — ASSESSMENT & PLAN NOTE
· Weight:  Achieve and maintain a healthy weight (Body Mass Index 18-25).  · Physical activity: Avoid inactivity and return to normal activities as soon as possible following diagnosis. Aim to exercise at least 150 minutes per week. Include strength-training exercises at least 2 days per week.  · Nutrition: Consume a healthy plant-based diet. Avoid sugary drinks and limit consumption of energy-dense foods. Eat at least five portions a day of a variety of vegetables, fruits, whole grains, and legumes such as beans. Limit consumption of red meats and avoid processed meats.  · Alcohol: Limit alcoholic drinks to no more than two a day for men and one a day for women.  · Sun Exposure: Limit direct sun exposure, use sunscreen, get annual skin exam. Choose a sunscreen that is broad spectrum protecting against UVA and UVB rays, at least SPF 30, water resistant or very water resistant for up to 40 or 80 mins.   · Tobacco: If you use tobacco products, try to quit. If someone in your household smokes, encourage them to quit.  · Supplements:  Try to obtain nutrients from healthy foods; generally, you should not need a supplement except for specific health problems.  · Immunizations: Annual influenza vaccine/ other vaccines that you qualify for.  Please let me know if you have any questions about these.    · General: Please follow up with your gyn, your dermatologist and your dentist, if you have not already done so. Do not text and drive.            I do recommend the covid 19 vaccine, if you have not had it. I am ready to discuss the benefits if you would like. You may be due for a  1st booster 5-6 months after your second vaccine    and/or 2nd booster vaccine 4-5 months after your 1st booster if you are over 50 years old.            Please continue to wear a tight fitting mask when in public settings especially when you are indoors.  This may still give you additional protection against covid-19.  The new covid vaccine is now  available.  Please send me a MyChart message if you have any questions.

## 2023-01-19 NOTE — PROGRESS NOTES
Patient ID: Silvia Billings is a 62 y.o. female.      Annual Exam       HPI    Review of Systems   Constitutional: Negative for activity change, appetite change, fatigue and unexpected weight change.   HENT: Negative for dental problem and hearing loss.    Eyes: Negative for visual disturbance.   Respiratory: Negative for cough, chest tightness and shortness of breath.    Cardiovascular: Negative for chest pain, palpitations and leg swelling.   Gastrointestinal: Negative for abdominal pain, blood in stool, constipation, diarrhea, nausea and vomiting.   Endocrine: Negative for cold intolerance, heat intolerance, polydipsia, polyphagia and polyuria.   Genitourinary: Negative for difficulty urinating, frequency and urgency.   Skin: Negative for rash.   Neurological: Negative for dizziness and headaches.   Psychiatric/Behavioral: Negative for dysphoric mood and sleep disturbance. The patient is not nervous/anxious.          Current Outpatient Medications:   •  cholecalciferol, vitamin D3, (VITAMIN D3 ORAL), Take by mouth., Disp: , Rfl:   •  estradioL 10 mcg tablet, Insert 10 mcg into the vagina 2 (two) times a week (Mon, Thu)., Disp: , Rfl:   •  ketoconazole (NIZORAL) 2 % cream, Apply topically daily., Disp: 30 g, Rfl: 1  •  propylene glycoL (SYSTANE COMPLETE) 0.6 % drops, Administer 2 drops into both eyes 3 (three) times a day., Disp: , Rfl:   •  rosuvastatin 5 mg tablet, TAKE 1 TABLET (5 MG TOTAL) BY MOUTH DAILY. (Patient not taking: Reported on 1/19/2023), Disp: 30 tablet, Rfl: 3  •  valACYclovir (VALTREX) 1 gram tablet, , Disp: , Rfl:     Allergies   Allergen Reactions   • Codeine      Other reaction(s): headache         Past Medical History:   Diagnosis Date   • Breast calcification, right    • Cystic acne    • Lipid disorder    • Night sweats    • Osteoporosis    • Squamous cell carcinoma of skin        Past Surgical History:   Procedure Laterality Date   • BREAST BIOPSY     • BREAST LUMPECTOMY     • COLONOSCOPY   2020    no polyps per patient       Family History   Problem Relation Age of Onset   • Lung cancer Biological Mother    • Breast cancer Biological Mother    • Hyperlipidemia Biological Mother    • Lung cancer Biological Father    • Heart disease Biological Sister    • Bipolar disorder Biological Brother    • Hyperlipidemia Biological Brother        Social History     Socioeconomic History   • Marital status:      Spouse name: None   • Number of children: None   • Years of education: None   • Highest education level: None   Tobacco Use   • Smoking status: Never   • Smokeless tobacco: Never   Vaping Use   • Vaping Use: Never used   Substance and Sexual Activity   • Alcohol use: Yes     Alcohol/week: 3.0 standard drinks     Types: 3 Glasses of wine per week   • Drug use: Never   • Sexual activity: Defer     Birth control/protection: Post-menopausal   Social History Narrative    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      Wt Readings from Last 3 Encounters:   01/19/23 50.3 kg (110 lb 12.8 oz)   01/13/22 50 kg (110 lb 3.2 oz)   09/23/21 50.6 kg (111 lb 9.6 oz)     BP Readings from Last 3 Encounters:   01/19/23 98/63   01/13/22 90/60   09/23/21 100/60     Pulse Readings from Last 3 Encounters:   01/19/23 71   01/13/22 73   09/23/21 65         Physical Exam  Vitals reviewed.   HENT:      Head: Normocephalic and atraumatic.      Right Ear: External ear normal.      Left Ear: External ear normal.   Eyes:      Conjunctiva/sclera: Conjunctivae normal.      Pupils: Pupils are equal, round, and reactive to light.   Neck:      Thyroid: No thyroid mass or thyromegaly.      Vascular: No carotid bruit or JVD.      Trachea: No tracheal deviation.   Cardiovascular:      Rate and Rhythm: Normal rate and regular rhythm.      Heart  sounds: Normal heart sounds. No murmur heard.    No friction rub. No gallop.   Pulmonary:      Effort: Pulmonary effort is normal. No respiratory distress.      Breath sounds: Normal breath sounds. No wheezing or rales.   Abdominal:      General: Bowel sounds are normal. There is no distension.      Palpations: Abdomen is soft.      Tenderness: There is no abdominal tenderness.   Musculoskeletal:         General: Normal range of motion.      Cervical back: Normal range of motion and neck supple.   Lymphadenopathy:      Cervical: No cervical adenopathy.   Skin:     General: Skin is warm and dry.   Neurological:      Mental Status: She is alert and oriented to person, place, and time.   Psychiatric:         Behavior: Behavior normal.         Thought Content: Thought content normal.         Assessment/Plan     Problem List Items Addressed This Visit     Lumbar spondylolysis    Squamous cell skin cancer    Relevant Medications    ketoconazole (NIZORAL) 2 % cream    Osteoporosis without current pathological fracture    Elevated LDL cholesterol level    Family history of pancreatic cancer    Family history of lung cancer    Vitamin D deficiency    Relevant Orders    Vitamin D 25 hydroxy    Routine adult health maintenance - Primary     · Weight:  Achieve and maintain a healthy weight (Body Mass Index 18-25).  · Physical activity: Avoid inactivity and return to normal activities as soon as possible following diagnosis. Aim to exercise at least 150 minutes per week. Include strength-training exercises at least 2 days per week.  · Nutrition: Consume a healthy plant-based diet. Avoid sugary drinks and limit consumption of energy-dense foods. Eat at least five portions a day of a variety of vegetables, fruits, whole grains, and legumes such as beans. Limit consumption of red meats and avoid processed meats.  · Alcohol: Limit alcoholic drinks to no more than two a day for men and one a day for women.  · Sun Exposure: Limit  direct sun exposure, use sunscreen, get annual skin exam. Choose a sunscreen that is broad spectrum protecting against UVA and UVB rays, at least SPF 30, water resistant or very water resistant for up to 40 or 80 mins.   · Tobacco: If you use tobacco products, try to quit. If someone in your household smokes, encourage them to quit.  · Supplements:  Try to obtain nutrients from healthy foods; generally, you should not need a supplement except for specific health problems.  · Immunizations: Annual influenza vaccine/ other vaccines that you qualify for.  Please let me know if you have any questions about these.    · General: Please follow up with your gyn, your dermatologist and your dentist, if you have not already done so. Do not text and drive.            I do recommend the covid 19 vaccine, if you have not had it. I am ready to discuss the benefits if you would like. You may be due for a  1st booster 5-6 months after your second vaccine    and/or 2nd booster vaccine 4-5 months after your 1st booster if you are over 50 years old.            Please continue to wear a tight fitting mask when in public settings especially when you are indoors.  This may still give you additional protection against covid-19.  The new covid vaccine is now available.  Please send me a [facility] message if you have any questions.           Lipid disorder    Relevant Orders    CBC and Differential    Comprehensive metabolic panel    Lipid panel    TSH 3rd Generation    Seborrhea    Relevant Medications    ketoconazole (NIZORAL) 2 % cream         Written education and action steps suggested to the patient are documented in After Visit Summary / Patient Instructions sections of this encounter.

## 2023-02-09 RX ORDER — ROSUVASTATIN CALCIUM 5 MG/1
5 TABLET, COATED ORAL DAILY
Qty: 30 TABLET | Refills: 3 | Status: SHIPPED | OUTPATIENT
Start: 2023-02-09 | End: 2023-08-11

## 2023-05-05 ENCOUNTER — TELEPHONE (OUTPATIENT)
Dept: INTERNAL MEDICINE | Facility: CLINIC | Age: 63
End: 2023-05-05

## 2023-05-05 NOTE — TELEPHONE ENCOUNTER
Insurance Referral Request   Patient PCP: Taylor Duque DO  Insurance Carrier: N/A  Specialist Name: Dr Pollard   Provider Specialty: dermatologist   Provider NPI: [id number]  Office Location: [address],[address]  Reason for Visit or Diagnosis Code: L57.8  Appointment Date: 3/16/23    Insurance Referral Request   Patient PCP: Taylor Duque DO  Insurance Carrier: N/A  Specialist Name: Dr Pollard   Provider Specialty: dermatologist   Provider NPI: [id number]  Office Location: [address],[address]  Reason for Visit or Diagnosis Code: Facial Rash R21  Appointment Date: 5/11/23  Insurance Referral will be submitted to Insurance within 2 business days.   Insurance Referral will be submitted to Insurance within 2 business days.

## 2023-05-23 ENCOUNTER — OFFICE VISIT (OUTPATIENT)
Dept: INTERNAL MEDICINE | Facility: CLINIC | Age: 63
End: 2023-05-23
Payer: COMMERCIAL

## 2023-05-23 VITALS
WEIGHT: 109 LBS | HEIGHT: 61 IN | OXYGEN SATURATION: 98 % | BODY MASS INDEX: 20.58 KG/M2 | RESPIRATION RATE: 16 BRPM | TEMPERATURE: 98.3 F | HEART RATE: 83 BPM | SYSTOLIC BLOOD PRESSURE: 107 MMHG | DIASTOLIC BLOOD PRESSURE: 68 MMHG

## 2023-05-23 DIAGNOSIS — M70.61 TROCHANTERIC BURSITIS OF BOTH HIPS: ICD-10-CM

## 2023-05-23 DIAGNOSIS — M79.10 MUSCLE ACHE: Primary | ICD-10-CM

## 2023-05-23 DIAGNOSIS — M43.06 LUMBAR SPONDYLOLYSIS: ICD-10-CM

## 2023-05-23 DIAGNOSIS — M70.62 TROCHANTERIC BURSITIS OF BOTH HIPS: ICD-10-CM

## 2023-05-23 DIAGNOSIS — E78.9 LIPID DISORDER: ICD-10-CM

## 2023-05-23 PROCEDURE — 99213 OFFICE O/P EST LOW 20 MIN: CPT | Performed by: INTERNAL MEDICINE

## 2023-05-23 PROCEDURE — 3008F BODY MASS INDEX DOCD: CPT | Performed by: INTERNAL MEDICINE

## 2023-05-23 RX ORDER — NAPROXEN SODIUM 220 MG
220 TABLET ORAL 2 TIMES DAILY WITH MEALS
Qty: 180 TABLET | Refills: 1 | COMMUNITY
Start: 2023-05-23 | End: 2025-02-10

## 2023-05-23 ASSESSMENT — ENCOUNTER SYMPTOMS
MYALGIAS: 1
DIARRHEA: 0
CONSTIPATION: 0
ABDOMINAL PAIN: 0
APPETITE CHANGE: 0
DYSPHORIC MOOD: 0
DIZZINESS: 0
FATIGUE: 0
PALPITATIONS: 0
HEADACHES: 0
VOMITING: 0
SHORTNESS OF BREATH: 0
FREQUENCY: 0
CHEST TIGHTNESS: 0
DIFFICULTY URINATING: 0
POLYDIPSIA: 0
SLEEP DISTURBANCE: 0
UNEXPECTED WEIGHT CHANGE: 0
POLYPHAGIA: 0
NAUSEA: 0
ACTIVITY CHANGE: 0
COUGH: 0
NERVOUS/ANXIOUS: 0
BLOOD IN STOOL: 0

## 2023-05-23 ASSESSMENT — PAIN SCALES - GENERAL: PAINLEVEL: 3

## 2023-05-23 NOTE — PROGRESS NOTES
Patient ID: Silvia Billings is a 63 y.o. female.      pain in hip       Started statin in feb 3 days a week.  About 2 months in, she started with hip pain lateral.  She also gets pain in her right shin.  Hip pain is new.  She can still walk.  advil or tylenol every once in a while.  Helps a little.  Pain was gradual in onset.  Staying the same, not worse.  Does wake her up from sleep from time to time.  She can tie her shoes.  No numbness, tingling or weakness.        Review of Systems   Constitutional: Negative for activity change, appetite change, fatigue and unexpected weight change.   HENT: Negative for dental problem and hearing loss.    Eyes: Negative for visual disturbance.   Respiratory: Negative for cough, chest tightness and shortness of breath.    Cardiovascular: Negative for chest pain, palpitations and leg swelling.   Gastrointestinal: Negative for abdominal pain, blood in stool, constipation, diarrhea, nausea and vomiting.   Endocrine: Negative for cold intolerance, heat intolerance, polydipsia, polyphagia and polyuria.   Genitourinary: Negative for difficulty urinating, frequency and urgency.   Musculoskeletal: Positive for myalgias.   Skin: Negative for rash.   Neurological: Negative for dizziness and headaches.   Psychiatric/Behavioral: Negative for dysphoric mood and sleep disturbance. The patient is not nervous/anxious.          Current Outpatient Medications:   •  cholecalciferol, vitamin D3, (VITAMIN D3 ORAL), Take by mouth., Disp: , Rfl:   •  estradioL 10 mcg tablet, Insert 10 mcg into the vagina 2 (two) times a week (Mon, Thu)., Disp: , Rfl:   •  naproxen sodium (ALEVE) 220 mg tablet, Take 1 tablet (220 mg total) by mouth 2 (two) times a day with meals., Disp: 180 tablet, Rfl: 1  •  propylene glycoL (SYSTANE COMPLETE) 0.6 % drops, Administer 2 drops into both eyes 3 (three) times a day., Disp: , Rfl:   •  rosuvastatin (CRESTOR) 5 mg tablet, TAKE 1 TABLET (5 MG TOTAL) BY MOUTH DAILY., Disp: 30  tablet, Rfl: 3  •  valACYclovir (VALTREX) 1 gram tablet, , Disp: , Rfl:     Allergies   Allergen Reactions   • Codeine      Other reaction(s): headache         Past Medical History:   Diagnosis Date   • Breast calcification, right    • Cystic acne    • Lipid disorder    • Night sweats    • Osteoporosis    • Squamous cell carcinoma of skin        Past Surgical History:   Procedure Laterality Date   • BREAST BIOPSY     • BREAST LUMPECTOMY     • COLONOSCOPY  2020    no polyps per patient       Family History   Problem Relation Age of Onset   • Lung cancer Biological Mother    • Breast cancer Biological Mother    • Hyperlipidemia Biological Mother    • Lung cancer Biological Father    • Heart disease Biological Sister    • Bipolar disorder Biological Brother    • Hyperlipidemia Biological Brother        Social History     Socioeconomic History   • Marital status:      Spouse name: None   • Number of children: None   • Years of education: None   • Highest education level: None   Tobacco Use   • Smoking status: Never   • Smokeless tobacco: Never   Vaping Use   • Vaping status: Never Used     Passive vaping exposure: Yes   Substance and Sexual Activity   • Alcohol use: Yes     Alcohol/week: 3.0 standard drinks of alcohol     Types: 3 Glasses of wine per week   • Drug use: Never   • Sexual activity: Defer     Birth control/protection: Post-menopausal   Social History Narrative    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      Wt Readings from Last 3 Encounters:   05/23/23 49.4 kg (109 lb)   01/19/23 50.3 kg (110 lb 12.8 oz)   01/13/22 50 kg (110 lb 3.2 oz)     BP Readings from Last 3 Encounters:   05/23/23 107/68   01/19/23 98/63   01/13/22 90/60     Pulse Readings from Last 3 Encounters:   05/23/23 83   01/19/23 71    01/13/22 73         Physical Exam  Vitals reviewed.   HENT:      Head: Normocephalic and atraumatic.      Right Ear: External ear normal.      Left Ear: External ear normal.   Eyes:      Conjunctiva/sclera: Conjunctivae normal.      Pupils: Pupils are equal, round, and reactive to light.   Neck:      Thyroid: No thyroid mass or thyromegaly.      Vascular: No carotid bruit or JVD.      Trachea: No tracheal deviation.   Cardiovascular:      Rate and Rhythm: Normal rate and regular rhythm.      Heart sounds: Normal heart sounds. No murmur heard.     No friction rub. No gallop.   Pulmonary:      Effort: Pulmonary effort is normal. No respiratory distress.      Breath sounds: Normal breath sounds. No wheezing or rales.   Abdominal:      General: Bowel sounds are normal. There is no distension.      Palpations: Abdomen is soft.      Tenderness: There is no abdominal tenderness.   Musculoskeletal:         General: No swelling, tenderness or deformity. Normal range of motion.      Cervical back: Normal range of motion and neck supple.      Right lower leg: No edema.      Left lower leg: No edema.   Lymphadenopathy:      Cervical: No cervical adenopathy.   Skin:     General: Skin is warm and dry.   Neurological:      Mental Status: She is alert and oriented to person, place, and time.   Psychiatric:         Behavior: Behavior normal.         Thought Content: Thought content normal.         Assessment/Plan     Problem List Items Addressed This Visit     Lumbar spondylolysis    Relevant Orders    Ambulatory referral to Physical Therapy    Lipid disorder    Relevant Orders    Cardio IQ(R) Apolipoprotein B    Trochanteric bursitis of both hips    Relevant Orders    Ambulatory referral to Physical Therapy   Other Visit Diagnoses     Muscle ache    -  Primary    Relevant Orders    Lipid panel    CK, Total    Hepatic function panel    Ambulatory referral to Physical Therapy            Written education and action steps suggested  to the patient are documented in After Visit Summary / Patient Instructions sections of this encounter.

## 2023-08-11 RX ORDER — ROSUVASTATIN CALCIUM 5 MG/1
TABLET, COATED ORAL
Qty: 30 TABLET | Refills: 3 | Status: SHIPPED | OUTPATIENT
Start: 2023-08-11 | End: 2023-09-07 | Stop reason: SDUPTHER

## 2023-09-07 ENCOUNTER — OFFICE VISIT (OUTPATIENT)
Dept: INTERNAL MEDICINE | Facility: CLINIC | Age: 63
End: 2023-09-07
Payer: COMMERCIAL

## 2023-09-07 VITALS
OXYGEN SATURATION: 98 % | TEMPERATURE: 98.3 F | BODY MASS INDEX: 20.58 KG/M2 | HEIGHT: 61 IN | RESPIRATION RATE: 16 BRPM | WEIGHT: 109 LBS | DIASTOLIC BLOOD PRESSURE: 68 MMHG | HEART RATE: 81 BPM | SYSTOLIC BLOOD PRESSURE: 110 MMHG

## 2023-09-07 DIAGNOSIS — M81.0 AGE-RELATED OSTEOPOROSIS WITHOUT CURRENT PATHOLOGICAL FRACTURE: ICD-10-CM

## 2023-09-07 DIAGNOSIS — R23.3 EASY BRUISING: Primary | ICD-10-CM

## 2023-09-07 DIAGNOSIS — Z80.3 FAMILY HISTORY OF BREAST CANCER: ICD-10-CM

## 2023-09-07 DIAGNOSIS — N83.201 CYST OF RIGHT OVARY: ICD-10-CM

## 2023-09-07 DIAGNOSIS — E78.00 ELEVATED LDL CHOLESTEROL LEVEL: ICD-10-CM

## 2023-09-07 DIAGNOSIS — R92.30 DENSE BREAST TISSUE ON MAMMOGRAM: ICD-10-CM

## 2023-09-07 DIAGNOSIS — T14.8XXA HEMATOMA: ICD-10-CM

## 2023-09-07 PROCEDURE — 3008F BODY MASS INDEX DOCD: CPT | Performed by: INTERNAL MEDICINE

## 2023-09-07 PROCEDURE — 99214 OFFICE O/P EST MOD 30 MIN: CPT | Performed by: INTERNAL MEDICINE

## 2023-09-07 RX ORDER — ROSUVASTATIN CALCIUM 5 MG/1
5 TABLET, COATED ORAL 3 TIMES WEEKLY
Qty: 30 TABLET | Refills: 11 | Status: SHIPPED | OUTPATIENT
Start: 2023-09-08 | End: 2024-05-06

## 2023-09-07 ASSESSMENT — PAIN SCALES - GENERAL: PAINLEVEL: 0-NO PAIN

## 2023-09-07 NOTE — PROGRESS NOTES
[facility]    Subjective:  Silvia Billings is a 63 y.o. female presenting with the chief complaint of:   Chief Complaint   Patient presents with    Establish Care       Chronic medical conditions include:  Patient Active Problem List   Diagnosis    Lumbar spondylolysis    Squamous cell skin cancer    Osteoporosis without current pathological fracture    Cystic acne    Breast calcification, right    Family history of breast cancer    Family history of heart disease    Screening for lipid disorders    Abnormal MRI, pelvis    Encounter for general adult medical examination without abnormal findings    Screening for HIV (human immunodeficiency virus)    Elevated LDL cholesterol level    Primary insomnia    Family history of pancreatic cancer    Family history of lung cancer    Vitamin D deficiency    Routine adult health maintenance    Lipid disorder    Seborrhea    Trochanteric bursitis of both hips       HPI:  Pt presents to transition care from Dr Duque    She bruises easily. She was watching her grandson and banged her arm. She had a large hematoma and ecchymosis that lasted x 2 weeks. She does still have a persistent lump in the area.     She used to take a baby aspirin but she was bruising too easily. She will bruise after walking in to anything.     She has a right ovarian cyst. She gets ultrasounds with gyn and there are discussions of whether she should have it taken out. She is asymptomatic. She reports it has not grown.     She has osteoporosis. She follows w/ rheumatology. She is now 2 years off of medication.     She does have HLD and she is on a statin. CAC was 0 9/2020. She couldn't tolerate daily crestor due to leg cramps. She cut back to a few times a week. Then went off of it. Cholesterol increased again. Then she started back on it 3 days a week and is tolerating it well.     Mom had breast cancer at 65. She was a heavy smoker.     She is open to  "breast US but would likely wait      The following portions of the patient's history were reviewed and updated as appropriate: allergies, current medications, family history, past medical history, social history, surgical history, and problem list.     Current Outpatient Medications   Medication Instructions    cholecalciferol, vitamin D3, (VITAMIN D3 ORAL) oral    estradioL 10 mcg, vaginal, 2 times weekly    naproxen sodium (ALEVE) 220 mg, oral, 2 times daily with meals    propylene glycoL (SYSTANE COMPLETE) 0.6 % drops 2 drops, Both Eyes, 3 times daily    rosuvastatin (CRESTOR) 5 mg, oral, 3 times weekly    valACYclovir (VALTREX) 1 gram tablet No dose, route, or frequency recorded.       Review of Systems  As noted in HPI and otherwise negative    Objective:    Vitals:    09/07/23 1405   BP: 110/68   BP Location: Right upper arm   Patient Position: Sitting   Pulse: 81   Resp: 16   Temp: 36.8 °C (98.3 °F)   TempSrc: Temporal   SpO2: 98%   Weight: 49.4 kg (109 lb)   Height: 1.549 m (5' 1\")     Wt Readings from Last 3 Encounters:   09/07/23 49.4 kg (109 lb)   05/23/23 49.4 kg (109 lb)   01/19/23 50.3 kg (110 lb 12.8 oz)       Physical Exam  Vitals reviewed.   Constitutional:       Appearance: Normal appearance.   HENT:      Head: Normocephalic and atraumatic.   Eyes:      Conjunctiva/sclera: Conjunctivae normal.   Cardiovascular:      Rate and Rhythm: Normal rate and regular rhythm.   Pulmonary:      Effort: Pulmonary effort is normal. No respiratory distress.      Breath sounds: No wheezing or rales.   Skin:     General: Skin is dry.      Comments: Non circumferential hematoma L forearm   Neurological:      General: No focal deficit present.      Mental Status: She is alert and oriented to person, place, and time.   Psychiatric:         Mood and Affect: Mood normal.         Behavior: Behavior normal.         Thought Content: Thought content normal.         Assessment & Plan:    Silvia was seen today for " establish care.    Diagnoses and all orders for this visit:    Easy bruising  Comments:  chronic, stable  consider coags w/ annual BW in Jan  CBC WNL 2/2023    Dense breast tissue on mammogram  Comments:  Gem model risk < 20% - would not be a candidate for breast MRI but could consider breast US  Orders:  -     ULTRASOUND BREAST COMPLETE BILATERAL; Future    Family history of breast cancer  Comments:  in mom  Gem model risk < 20% - would not be a candidate for breast MRI but could consider breast US  Orders:  -     ULTRASOUND BREAST COMPLETE BILATERAL; Future    Cyst of right ovary  Comments:  monitored regularly w/ gynecology    Age-related osteoporosis without current pathological fracture  Comments:  following w/ rheumatology. no longer on medication    Elevated LDL cholesterol level  Comments:  continues on statin w/ good response per recent labs  CAC 0 9/2020    Hematoma  Comments:  discussed can take 6-8 weeks for full resolution  can try warm compresses    Other orders  -     rosuvastatin (CRESTOR) 5 mg tablet; Take 1 tablet (5 mg total) by mouth 3 (three) times a week (Mon, Wed, Fri).        Return in about 4 months (around 1/20/2024) for Annual Physical Exam.        Eloisa Henson,   09/12/23

## 2024-01-23 ENCOUNTER — OFFICE VISIT (OUTPATIENT)
Dept: INTERNAL MEDICINE | Facility: CLINIC | Age: 64
End: 2024-01-23
Payer: COMMERCIAL

## 2024-01-23 VITALS
HEART RATE: 68 BPM | BODY MASS INDEX: 20.39 KG/M2 | SYSTOLIC BLOOD PRESSURE: 112 MMHG | DIASTOLIC BLOOD PRESSURE: 72 MMHG | RESPIRATION RATE: 16 BRPM | WEIGHT: 108 LBS | OXYGEN SATURATION: 97 % | HEIGHT: 61 IN | TEMPERATURE: 97.9 F

## 2024-01-23 DIAGNOSIS — M81.0 AGE-RELATED OSTEOPOROSIS WITHOUT CURRENT PATHOLOGICAL FRACTURE: ICD-10-CM

## 2024-01-23 DIAGNOSIS — N95.2 VAGINAL ATROPHY: ICD-10-CM

## 2024-01-23 DIAGNOSIS — Z00.01 ENCOUNTER FOR ROUTINE ADULT HEALTH EXAMINATION WITH ABNORMAL FINDINGS: Primary | ICD-10-CM

## 2024-01-23 DIAGNOSIS — C44.92 SQUAMOUS CELL SKIN CANCER: ICD-10-CM

## 2024-01-23 DIAGNOSIS — Z13.29 SCREENING FOR THYROID DISORDER: ICD-10-CM

## 2024-01-23 DIAGNOSIS — R23.3 EASY BRUISING: ICD-10-CM

## 2024-01-23 DIAGNOSIS — H01.001 BLEPHARITIS OF UPPER EYELIDS OF BOTH EYES, UNSPECIFIED TYPE: ICD-10-CM

## 2024-01-23 DIAGNOSIS — F51.01 PRIMARY INSOMNIA: ICD-10-CM

## 2024-01-23 DIAGNOSIS — E78.00 PURE HYPERCHOLESTEROLEMIA: ICD-10-CM

## 2024-01-23 DIAGNOSIS — R25.2 FOOT CRAMPS: ICD-10-CM

## 2024-01-23 DIAGNOSIS — E55.9 VITAMIN D DEFICIENCY: ICD-10-CM

## 2024-01-23 DIAGNOSIS — Z13.1 SCREENING FOR DIABETES MELLITUS: ICD-10-CM

## 2024-01-23 DIAGNOSIS — Z23 NEED FOR PNEUMOCOCCAL 20-VALENT CONJUGATE VACCINATION: ICD-10-CM

## 2024-01-23 DIAGNOSIS — H01.004 BLEPHARITIS OF UPPER EYELIDS OF BOTH EYES, UNSPECIFIED TYPE: ICD-10-CM

## 2024-01-23 DIAGNOSIS — N83.201 CYST OF RIGHT OVARY: ICD-10-CM

## 2024-01-23 PROCEDURE — 3008F BODY MASS INDEX DOCD: CPT | Performed by: INTERNAL MEDICINE

## 2024-01-23 PROCEDURE — 90677 PCV20 VACCINE IM: CPT | Performed by: INTERNAL MEDICINE

## 2024-01-23 PROCEDURE — 90471 IMMUNIZATION ADMIN: CPT | Performed by: INTERNAL MEDICINE

## 2024-01-23 PROCEDURE — 99397 PER PM REEVAL EST PAT 65+ YR: CPT | Mod: 25 | Performed by: INTERNAL MEDICINE

## 2024-01-23 ASSESSMENT — PAIN SCALES - GENERAL: PAINLEVEL: 0-NO PAIN

## 2024-01-23 ASSESSMENT — PATIENT HEALTH QUESTIONNAIRE - PHQ9: SUM OF ALL RESPONSES TO PHQ9 QUESTIONS 1 & 2: 0

## 2024-01-23 NOTE — PATIENT INSTRUCTIONS
Systane rewetting drops twice a day  Warm compress 5 minutes twice a day  Lid scrubs (like J&J - wash your eye lids and lashes)  Stop aquaphor on eyelids  Noncomedogenic - Neutrogena Hyaluronic acid    Foot cramps:  Hydrate, B complex, or magnesium glycinate 400mg nightly (can cause diarrhea / make you sleepy).     Vitamin D3 1000IU daily

## 2024-01-23 NOTE — PROGRESS NOTES
[facility]    Subjective:  Silvia Billings is a 63 y.o. female presenting with the chief complaint of:   Chief Complaint   Patient presents with   • Annual Exam       Chronic medical conditions include:  Patient Active Problem List   Diagnosis   • Lumbar spondylolysis   • Squamous cell skin cancer   • Osteoporosis without current pathological fracture   • Cystic acne   • Breast calcification, right   • Family history of breast cancer   • Family history of heart disease   • Screening for lipid disorders   • Abnormal MRI, pelvis   • Encounter for general adult medical examination without abnormal findings   • Screening for HIV (human immunodeficiency virus)   • Elevated LDL cholesterol level   • Primary insomnia   • Family history of pancreatic cancer   • Family history of lung cancer   • Vitamin D deficiency   • Routine adult health maintenance   • Lipid disorder   • Seborrhea   • Trochanteric bursitis of both hips       HPI:  Pt presents for annual exam  Her daughter got  this past weekend. Will be doing honeymoon in [place].  Her daughter is pulm/crit    She has her mammo scheduled Friday  Saw gyn yesterday    She has had some issues w/ swollen eyelids, itchiness, dry skin. She uses systane. Started using an antihistamine eye drop. Has been using aquaphor on her eyelids.     Seeing rheumatologist in February. Planning for reclast. Reports her DEXA in Dec demonstrated worsening of the spine.     Taking rosuvastatin three times a week. Doesn't have muscle cramps at three times a week but can get muscle cramps if she takes it more frequently. Has some cramping which was occurring before she started the statin.    She needs to be more consistent w/ her vitamin D    Colonoscopy 8/2019 - 10 year recall.    R ovarian cyst - monitored annually w/ gyn. Last  11/28/2023.     She felt she got headaches on vaginal estrogen. Was holding off until after the wedding. Starting with once a week  "dose. She is on this for vaginal atrophy and dryness.     She got her covid and flu shot this season.   RSV vaccine at the pharmacy.     Gets cramping in her feet.     She sometimes gets up at night to urinate and sometimes can't go back to sleep.     The following portions of the patient's history were reviewed and updated as appropriate: allergies, current medications, family history, past medical history, social history, surgical history, and problem list.     Current Outpatient Medications   Medication Instructions   • cholecalciferol, vitamin D3, (VITAMIN D3 ORAL) oral   • estradioL 10 mcg, vaginal, 2 times weekly   • naproxen sodium (ALEVE) 220 mg, oral, 2 times daily with meals   • propylene glycoL (SYSTANE COMPLETE) 0.6 % drops 2 drops, Both Eyes, 3 times daily   • rosuvastatin (CRESTOR) 5 mg, oral, 3 times weekly   • valACYclovir (VALTREX) 1 gram tablet No dose, route, or frequency recorded.       Review of Systems  As noted in HPI and otherwise negative    Objective:    Vitals:    01/23/24 0806   BP: 112/72   BP Location: Right upper arm   Patient Position: Sitting   Pulse: 68   Resp: 16   Temp: 36.6 °C (97.9 °F)   TempSrc: Temporal   SpO2: 97%   Weight: 49 kg (108 lb)   Height: 1.549 m (5' 1\")     Wt Readings from Last 3 Encounters:   01/23/24 49 kg (108 lb)   09/07/23 49.4 kg (109 lb)   05/23/23 49.4 kg (109 lb)       Physical Exam  Vitals reviewed.   Constitutional:       Appearance: Normal appearance.   HENT:      Head: Normocephalic and atraumatic.      Right Ear: Tympanic membrane, ear canal and external ear normal. There is no impacted cerumen.      Left Ear: Tympanic membrane, ear canal and external ear normal. There is no impacted cerumen.   Eyes:      Conjunctiva/sclera: Conjunctivae normal.      Pupils: Pupils are equal, round, and reactive to light.   Neck:      Thyroid: No thyroid mass, thyromegaly or thyroid tenderness.   Cardiovascular:      Rate and Rhythm: Normal rate and regular rhythm. "      Heart sounds: Normal heart sounds. No murmur heard.  Pulmonary:      Effort: Pulmonary effort is normal. No respiratory distress.      Breath sounds: No wheezing, rhonchi or rales.   Abdominal:      General: Abdomen is flat. Bowel sounds are normal. There is no distension.      Tenderness: There is no abdominal tenderness. There is no guarding.   Musculoskeletal:      Right lower leg: No edema.      Left lower leg: No edema.   Lymphadenopathy:      Cervical: No cervical adenopathy.   Skin:     General: Skin is warm and dry.   Neurological:      General: No focal deficit present.      Mental Status: She is alert and oriented to person, place, and time.   Psychiatric:         Mood and Affect: Mood normal.         Behavior: Behavior normal.         Thought Content: Thought content normal.         Judgment: Judgment normal.         Assessment & Plan:    Silvia was seen today for annual exam.    Diagnoses and all orders for this visit:    Encounter for routine adult health examination with abnormal findings  -Encouraged regular exercise and healthy diet.  -Encouraged regular eye and dental appointments.  -Health maintenance updated/addressed as noted in HPI and below  -Routine vaccinations recommended     Blepharitis of upper eyelids of both eyes, unspecified type  Systane rewetting drops twice a day  Warm compress 5 minutes twice a day  Lid scrubs (like J&J - wash your eye lids and lashes)  Stop aquaphor on eyelids  Noncomedogenic - Neutrogena Hyaluronic acid    Pure hypercholesterolemia  Comments:  continue stating three times a week  no myalgias on this dose  Orders:  -     CBC and Differential; Future  -     Comprehensive metabolic panel; Future  -     Lipid panel; Future  -     CBC and Differential  -     Comprehensive metabolic panel  -     Lipid panel  -     CBC and Differential; Future  -     Comprehensive metabolic panel; Future  -     Lipid panel; Future  -     CBC and Differential  -     Comprehensive  metabolic panel  -     Lipid panel    Vitamin D deficiency  Comments:  irregularly taking her supplement  Orders:  -     Vitamin D 25 hydroxy; Future  -     Vitamin D 25 hydroxy  -     Vitamin D 25 hydroxy; Future  -     Vitamin D 25 hydroxy    Age-related osteoporosis without current pathological fracture  Comments:  Seeing rheumatology in Feb and planning for Reclast    Vaginal atrophy  Comments:  restarting vaginal estrogen     Foot cramps  Comments:  Hydrate, B complex, or magnesium glycinate 400mg nightly (can cause diarrhea / make you sleepy).     Primary insomnia  Comments:  try magnesium    Cyst of right ovary  Comments:  monitored annually  stable on last US 12/2022    Easy bruising  Comments:  check labs  likely due to thin skin  Orders:  -     Protime-INR; Future  -     PTT; Future  -     Protime-INR  -     PTT  -     PTT; Future  -     Protime-INR; Future  -     PTT  -     Protime-INR    Squamous cell skin cancer  Comments:  continue regular f/u w/ dermatology    Screening for diabetes mellitus  -     Hemoglobin A1c; Future  -     Hemoglobin A1c  -     Hemoglobin A1c; Future  -     Hemoglobin A1c    Screening for thyroid disorder  -     TSH w reflex FT4; Future  -     TSH w reflex FT4  -     TSH w reflex FT4; Future  -     TSH w reflex FT4    Need for pneumococcal 20-valent conjugate vaccination  -     Pneumococcal conjugate vaccine 20-valent IM        No follow-ups on file.    Health Maintenance   Topic Date Due   • RSV (60+ years old [shared decision making] or in pregnancy during 32 through 36 weeks) (1 - 1-dose 60+ series) Never done   • Falls Risk Screening  Never done   • Influenza Vaccine (1) 08/01/2023   • COVID-19 Vaccine (7 - 2023-24 season) 12/16/2023   • Breast Cancer Screening  12/28/2023   • Cervical Cancer Screening  10/02/2024   • Depression Screening  01/23/2025   • DEXA Scan  12/11/2025   • Colorectal Cancer Screening  12/20/2029   • DTaP, Tdap, and Td Vaccines (3 - Td or Tdap)  01/13/2032   • Zoster Vaccine  Completed   • Hepatitis C Screening  Completed   • Pneumococcal (65 years and older)  Completed   • HIV Screening  Addressed   • Meningococcal ACWY  Aged Out   • RSV <20 months  Aged Out   • HIB Vaccines  Aged Out   • Hepatitis B Vaccines  Aged Out   • IPV Vaccines  Aged Out   • HPV Vaccines  Aged Out       Eloisa Henson DO  01/23/24

## 2024-02-01 LAB
25(OH)D3 SERPL-MCNC: 40 NG/ML (ref 30–100)
ALBUMIN SERPL-MCNC: 4.6 G/DL (ref 3.6–5.1)
ALBUMIN/GLOB SERPL: 2.3 (CALC) (ref 1–2.5)
ALP SERPL-CCNC: 53 U/L (ref 37–153)
ALT SERPL-CCNC: 14 U/L (ref 6–29)
APTT PPP: 27 SEC (ref 23–32)
AST SERPL-CCNC: 19 U/L (ref 10–35)
BASOPHILS # BLD AUTO: 52 CELLS/UL (ref 0–200)
BASOPHILS NFR BLD AUTO: 1.1 %
BILIRUB SERPL-MCNC: 0.5 MG/DL (ref 0.2–1.2)
BUN SERPL-MCNC: 15 MG/DL (ref 7–25)
BUN/CREAT SERPL: NORMAL (CALC) (ref 6–22)
CALCIUM SERPL-MCNC: 9.5 MG/DL (ref 8.6–10.4)
CHLORIDE SERPL-SCNC: 105 MMOL/L (ref 98–110)
CHOLEST SERPL-MCNC: 169 MG/DL
CHOLEST/HDLC SERPL: 2.1 (CALC)
CO2 SERPL-SCNC: 32 MMOL/L (ref 20–32)
CREAT SERPL-MCNC: 0.73 MG/DL (ref 0.5–1.05)
EGFRCR SERPLBLD CKD-EPI 2021: 91 ML/MIN/1.73M2
EOSINOPHIL # BLD AUTO: 38 CELLS/UL (ref 15–500)
EOSINOPHIL NFR BLD AUTO: 0.8 %
ERYTHROCYTE [DISTWIDTH] IN BLOOD BY AUTOMATED COUNT: 12.2 % (ref 11–15)
GLOBULIN SER CALC-MCNC: 2 G/DL (CALC) (ref 1.9–3.7)
GLUCOSE SERPL-MCNC: 88 MG/DL (ref 65–99)
HBA1C MFR BLD: 5.2 % OF TOTAL HGB
HCT VFR BLD AUTO: 39.4 % (ref 35–45)
HDLC SERPL-MCNC: 81 MG/DL
HGB BLD-MCNC: 13.2 G/DL (ref 11.7–15.5)
INR PPP: 1
LDLC SERPL CALC-MCNC: 74 MG/DL (CALC)
LYMPHOCYTES # BLD AUTO: 1490 CELLS/UL (ref 850–3900)
LYMPHOCYTES NFR BLD AUTO: 31.7 %
MCH RBC QN AUTO: 31.2 PG (ref 27–33)
MCHC RBC AUTO-ENTMCNC: 33.5 G/DL (ref 32–36)
MCV RBC AUTO: 93.1 FL (ref 80–100)
MONOCYTES # BLD AUTO: 367 CELLS/UL (ref 200–950)
MONOCYTES NFR BLD AUTO: 7.8 %
NEUTROPHILS # BLD AUTO: 2754 CELLS/UL (ref 1500–7800)
NEUTROPHILS NFR BLD AUTO: 58.6 %
NONHDLC SERPL-MCNC: 88 MG/DL (CALC)
PLATELET # BLD AUTO: 183 THOUSAND/UL (ref 140–400)
PMV BLD REES-ECKER: 10.9 FL (ref 7.5–12.5)
POTASSIUM SERPL-SCNC: 4.3 MMOL/L (ref 3.5–5.3)
PROT SERPL-MCNC: 6.6 G/DL (ref 6.1–8.1)
PROTHROMBIN TIME: 10.3 SEC (ref 9–11.5)
RBC # BLD AUTO: 4.23 MILLION/UL (ref 3.8–5.1)
SODIUM SERPL-SCNC: 142 MMOL/L (ref 135–146)
TRIGL SERPL-MCNC: 56 MG/DL
TSH SERPL-ACNC: 2.64 MIU/L (ref 0.4–4.5)
WBC # BLD AUTO: 4.7 THOUSAND/UL (ref 3.8–10.8)

## 2024-05-06 RX ORDER — ROSUVASTATIN CALCIUM 5 MG/1
TABLET, COATED ORAL
Qty: 90 TABLET | Refills: 0 | Status: SHIPPED | OUTPATIENT
Start: 2024-05-06

## 2024-05-06 NOTE — TELEPHONE ENCOUNTER
Medicine Refill Request    Last Office Visit: 1/23/2024   Last Consult Visit: Visit date not found  Last Telemedicine Visit: Visit date not found    Next Appointment: 1/24/2025      Current Outpatient Medications:     cholecalciferol, vitamin D3, (VITAMIN D3 ORAL), Take by mouth., Disp: , Rfl:     estradioL 10 mcg tablet, Insert 10 mcg into the vagina 2 (two) times a week (Mon, Thu)., Disp: , Rfl:     naproxen sodium (ALEVE) 220 mg tablet, Take 1 tablet (220 mg total) by mouth 2 (two) times a day with meals., Disp: 180 tablet, Rfl: 1    propylene glycoL (SYSTANE COMPLETE) 0.6 % drops, Administer 2 drops into both eyes 3 (three) times a day., Disp: , Rfl:     rosuvastatin (CRESTOR) 5 mg tablet, Take 1 tablet (5 mg total) by mouth 3 (three) times a week (Mon, Wed, Fri)., Disp: 30 tablet, Rfl: 11    valACYclovir (VALTREX) 1 gram tablet, , Disp: , Rfl:       BP Readings from Last 3 Encounters:   01/23/24 112/72   09/07/23 110/68   05/23/23 107/68       Recent Lab results:  Lab Results   Component Value Date    CHOL 169 01/31/2024   ,   Lab Results   Component Value Date    HDL 81 01/31/2024   ,   Lab Results   Component Value Date    LDLCALC 74 01/31/2024   ,   Lab Results   Component Value Date    TRIG 56 01/31/2024        Lab Results   Component Value Date    GLUCOSE 88 01/31/2024   ,   Lab Results   Component Value Date    HGBA1C 5.2 01/31/2024         Lab Results   Component Value Date    CREATININE 0.73 01/31/2024       Lab Results   Component Value Date    TSH 2.64 01/31/2024           Lab Results   Component Value Date    HGBA1C 5.2 01/31/2024

## 2024-10-11 ENCOUNTER — TELEPHONE (OUTPATIENT)
Dept: INTERNAL MEDICINE | Facility: CLINIC | Age: 64
End: 2024-10-11

## 2024-10-11 NOTE — TELEPHONE ENCOUNTER
Request for Medical Advice   Patient PCP: Eloisa Henson, DO  New or Existing Issue: new  Question or Concern: achy, chills, headache, negative covid test, thinks she has flu, no appts left for today and no AOD, call back to discuss  Preferred Pharmacy:   [facility] - [facility] [facility] - [address]  [address] [place] [address]  Phone: [phone] Fax: [phone]

## 2024-10-11 NOTE — TELEPHONE ENCOUNTER
Called and spoke with Silvia, symptoms began Wednesday 10/9. Covid neg x 2, last test yesterday. She had Covid in August. Was due for flu vaccine Wednesday, but didn't get it as she was sick. She feels she has a fever as she gets chills, then takes Advil or tylenol and sweats. However, her oral digital thermometer is not showing a fever, nothing above 97. She has a mild cough, no sore throat. She has body aches, joint aches, muscle aches. Taking tylenol and advil alternating around the clock. She had Tamiflu at home and took 2 doses which gave her a terrible HA so she does not want to take that anymore. Reviewed that if she has flu then it would be comfort measures which she is already doing and going to the ED if symptoms become severe. She wants a test to confirm flu, but we have no appointments available. I advised she could go to  if having confirmation is that important to her. We reviewed recommendations for decreasing transmission of infection to others.

## 2024-12-27 ENCOUNTER — CLINICAL SUPPORT (OUTPATIENT)
Dept: INTERNAL MEDICINE | Facility: CLINIC | Age: 64
End: 2024-12-27
Payer: COMMERCIAL

## 2024-12-27 DIAGNOSIS — Z23 NEED FOR COVID-19 VACCINE: Primary | ICD-10-CM

## 2024-12-27 PROCEDURE — 90480 ADMN SARSCOV2 VAC 1/ONLY CMP: CPT | Performed by: INTERNAL MEDICINE

## 2024-12-27 PROCEDURE — 91322 SARSCOV2 VAC 50 MCG/0.5ML IM: CPT | Performed by: INTERNAL MEDICINE

## 2025-02-10 ENCOUNTER — HOSPITAL ENCOUNTER (OUTPATIENT)
Dept: RADIOLOGY | Facility: HOSPITAL | Age: 65
Discharge: HOME | End: 2025-02-10
Attending: INTERNAL MEDICINE
Payer: COMMERCIAL

## 2025-02-10 ENCOUNTER — OFFICE VISIT (OUTPATIENT)
Dept: INTERNAL MEDICINE | Facility: CLINIC | Age: 65
End: 2025-02-10
Payer: COMMERCIAL

## 2025-02-10 VITALS
HEIGHT: 61 IN | OXYGEN SATURATION: 99 % | HEART RATE: 82 BPM | TEMPERATURE: 98 F | WEIGHT: 112 LBS | DIASTOLIC BLOOD PRESSURE: 68 MMHG | BODY MASS INDEX: 21.14 KG/M2 | RESPIRATION RATE: 16 BRPM | SYSTOLIC BLOOD PRESSURE: 100 MMHG

## 2025-02-10 DIAGNOSIS — Z12.31 ENCOUNTER FOR SCREENING MAMMOGRAM FOR BREAST CANCER: ICD-10-CM

## 2025-02-10 DIAGNOSIS — C44.92 SQUAMOUS CELL SKIN CANCER: ICD-10-CM

## 2025-02-10 DIAGNOSIS — M79.671 RIGHT FOOT PAIN: ICD-10-CM

## 2025-02-10 DIAGNOSIS — E55.9 VITAMIN D DEFICIENCY: ICD-10-CM

## 2025-02-10 DIAGNOSIS — N83.201 CYST OF RIGHT OVARY: ICD-10-CM

## 2025-02-10 DIAGNOSIS — Z13.29 SCREENING FOR THYROID DISORDER: ICD-10-CM

## 2025-02-10 DIAGNOSIS — S99.921A INJURY OF RIGHT FOOT, INITIAL ENCOUNTER: ICD-10-CM

## 2025-02-10 DIAGNOSIS — M81.0 AGE-RELATED OSTEOPOROSIS WITHOUT CURRENT PATHOLOGICAL FRACTURE: ICD-10-CM

## 2025-02-10 DIAGNOSIS — Z00.01 ENCOUNTER FOR ROUTINE ADULT HEALTH EXAMINATION WITH ABNORMAL FINDINGS: Primary | ICD-10-CM

## 2025-02-10 DIAGNOSIS — N95.2 VAGINAL ATROPHY: ICD-10-CM

## 2025-02-10 DIAGNOSIS — E78.00 PURE HYPERCHOLESTEROLEMIA: ICD-10-CM

## 2025-02-10 DIAGNOSIS — Z13.1 SCREENING FOR DIABETES MELLITUS: ICD-10-CM

## 2025-02-10 PROBLEM — Z13.220 SCREENING FOR LIPID DISORDERS: Status: RESOLVED | Noted: 2018-12-04 | Resolved: 2025-02-10

## 2025-02-10 PROBLEM — Z11.4 SCREENING FOR HIV (HUMAN IMMUNODEFICIENCY VIRUS): Status: RESOLVED | Noted: 2019-12-20 | Resolved: 2025-02-10

## 2025-02-10 PROBLEM — L70.0 CYSTIC ACNE: Status: RESOLVED | Noted: 2018-12-04 | Resolved: 2025-02-10

## 2025-02-10 PROBLEM — L21.9 SEBORRHEA: Status: RESOLVED | Noted: 2023-01-19 | Resolved: 2025-02-10

## 2025-02-10 PROBLEM — Z00.00 ENCOUNTER FOR GENERAL ADULT MEDICAL EXAMINATION WITHOUT ABNORMAL FINDINGS: Status: RESOLVED | Noted: 2019-12-20 | Resolved: 2025-02-10

## 2025-02-10 PROBLEM — Z00.00 ROUTINE ADULT HEALTH MAINTENANCE: Status: RESOLVED | Noted: 2022-01-13 | Resolved: 2025-02-10

## 2025-02-10 PROCEDURE — 3008F BODY MASS INDEX DOCD: CPT | Performed by: INTERNAL MEDICINE

## 2025-02-10 PROCEDURE — 99397 PER PM REEVAL EST PAT 65+ YR: CPT | Performed by: INTERNAL MEDICINE

## 2025-02-10 PROCEDURE — 73630 X-RAY EXAM OF FOOT: CPT | Mod: RT

## 2025-02-10 ASSESSMENT — PATIENT HEALTH QUESTIONNAIRE - PHQ9: SUM OF ALL RESPONSES TO PHQ9 QUESTIONS 1 & 2: 0

## 2025-02-10 NOTE — PATIENT INSTRUCTIONS
OB/GYN recommendations:  [facility]  Ozzy Barrera, Jeramie June, Bobbi Navarrete, Merna Cervantes  Tel. [phone]    [facility]  Ozzy Riggs, Aleja Walker, and Dawson Alvse  Tel. [phone]     [facility]  Ozzy Smart, Karlie Rmoeo, and Faraz Rodgers   Tel. [phone]     Consider [employer]

## 2025-02-10 NOTE — PROGRESS NOTES
[facility]    Subjective:  Silvia Billings is a 64 y.o. female presenting with the chief complaint of:   Chief Complaint   Patient presents with    Annual Exam       Chronic medical conditions include:  Patient Active Problem List   Diagnosis    Lumbar spondylolysis    Squamous cell skin cancer    Osteoporosis without current pathological fracture    Breast calcification, right    Family history of breast cancer    Family history of heart disease    Abnormal MRI, pelvis    Pure hypercholesterolemia    Primary insomnia    Family history of pancreatic cancer    Family history of lung cancer    Vitamin D deficiency    Trochanteric bursitis of both hips    Cyst of right ovary       HPI:  Patient presents for annual exam    Was in [place] last week with 2 of her daughters and her older sister.   The first night she tripped on a cement landing in the [employer]  She has been taking advil/tylenol around the clock.   Has pain with sitting/walking.     Her gyn has gone .     Colonoscopy 8/2019 - 10 year recall.  Mammogram January 26, 2024-BI-RADS 1. Has this scheduled for Friday.  R ovarian cyst - monitored annually w/ gyn. Last US 11/28/2023.     Osteoporosis - DEXA 12/2023  BILATERAL FEMORAL NECK TOTAL:  Osteopenia.   The bone mineral density of the hips has decreased 3%     LUMBAR SPINE: Osteoporosis.  The bone mineral density has decreased 1.6%     Scores worsened on her bisphosphonate hiatus  Took risendronate 5610-6965    Sees Dr Reyes and gets Reclast once a year.     HLD - on statin 3x/wk  Taking vit D when she remembers  Eats a high calcium diet - has cheese, drinks milk    She walks regularly.  Tries to stay active in general.     Not on vaginal estrogen cream since this caused a headache. Instead she uses hyaluronic vaginal cream. She has dyspareunia. If she does the creams, then it is easier. Also using Rephresh.     She saw an ophthalmologist  Eye dryness is waxing and waning.  "    Older daughter got  last year.   Middle daughter has 2 children.       The following portions of the patient's history were reviewed and updated as appropriate: allergies, current medications, family history, past medical history, social history, surgical history, and problem list.     Current Outpatient Medications   Medication Instructions    cholecalciferol, vitamin D3, (VITAMIN D3 ORAL) oral    rosuvastatin (CRESTOR) 5 mg tablet TAKE ONE (1) TABLET (5 MG TOTAL) BY MOUTH DAILY.    valACYclovir (VALTREX) 1 gram tablet No dose, route, or frequency recorded.       Review of Systems  As noted in HPI and otherwise negative    Objective:    Vitals:    02/10/25 1256   BP: 100/68   BP Location: Right upper arm   Patient Position: Sitting   Pulse: 82   Resp: 16   Temp: 36.7 °C (98 °F)   TempSrc: Temporal   SpO2: 99%   Weight: 50.8 kg (112 lb)   Height: 1.549 m (5' 1\")     Wt Readings from Last 3 Encounters:   02/10/25 50.8 kg (112 lb)   01/23/24 49 kg (108 lb)   09/07/23 49.4 kg (109 lb)       Body mass index is 21.16 kg/m².    Physical Exam  Vitals reviewed.   Constitutional:       Appearance: Normal appearance.   HENT:      Head: Normocephalic and atraumatic.      Right Ear: Tympanic membrane, ear canal and external ear normal. There is no impacted cerumen.      Left Ear: Tympanic membrane, ear canal and external ear normal. There is no impacted cerumen.      Mouth/Throat:      Pharynx: No oropharyngeal exudate or posterior oropharyngeal erythema.   Eyes:      Conjunctiva/sclera: Conjunctivae normal.      Pupils: Pupils are equal, round, and reactive to light.   Neck:      Thyroid: No thyroid mass, thyromegaly or thyroid tenderness.   Cardiovascular:      Rate and Rhythm: Normal rate and regular rhythm.      Pulses:           Dorsalis pedis pulses are 2+ on the right side and 2+ on the left side.        Posterior tibial pulses are 2+ on the right side and 2+ on the left side.      Heart sounds: Normal heart " sounds. No murmur heard.  Pulmonary:      Effort: Pulmonary effort is normal. No respiratory distress.      Breath sounds: No wheezing, rhonchi or rales.   Abdominal:      General: Abdomen is flat. Bowel sounds are normal. There is no distension.      Tenderness: There is no abdominal tenderness. There is no guarding.   Musculoskeletal:      Right lower leg: No edema.      Left lower leg: No edema.        Feet:    Lymphadenopathy:      Cervical: No cervical adenopathy.   Skin:     General: Skin is warm and dry.   Neurological:      General: No focal deficit present.      Mental Status: She is alert and oriented to person, place, and time.   Psychiatric:         Mood and Affect: Mood normal.         Behavior: Behavior normal.         Thought Content: Thought content normal.         Judgment: Judgment normal.         Assessment & Plan:    Silvia was seen today for annual exam.    Diagnoses and all orders for this visit:    Encounter for routine adult health examination with abnormal findings  -Encouraged regular exercise and healthy diet.  -Encouraged regular eye and dental appointments.  -Health maintenance updated/addressed as noted in HPI and below  -Routine vaccinations recommended     Injury of right foot, initial encounter  Comments:  Possibly fractured given severity of persistent pain  Check x-ray  Orders:  -     X-RAY FOOT RIGHT 3+ VIEWS; Future    Right foot pain  Comments:  As above  Orders:  -     X-RAY FOOT RIGHT 3+ VIEWS; Future    Vitamin D deficiency  Comments:  Continue supplementation  Orders:  -     Vitamin D 25 hydroxy; Future  -     Vitamin D 25 hydroxy    Age-related osteoporosis without current pathological fracture  Comments:  Sees Dr. Reyes.  On Reclast.    Squamous cell skin cancer  Comments:  Continue follow-up with Derm    Pure hypercholesterolemia  Comments:  LDL looks great.  Coronary artery calcium score 0 in 2020.  Discussed option of rechecking this year given her family  history.  Orders:  -     CBC and Differential; Future  -     Comprehensive metabolic panel; Future  -     Lipid panel; Future  -     CT HEART CORONARY ARTERY CALCIUM SCORE WITHOUT IV CONTRAST; Future  -     CBC and Differential  -     Comprehensive metabolic panel  -     Lipid panel    Vaginal atrophy  Comments:  Not taking vaginal estrogen due to headache  Try a silicone based lubricant    Cyst of right ovary  Comments:  Gets annual monitoring.  Ultrasound ordered.  Orders:  -     US PELVIS TRANSABDOMINAL & TRANSVAGINAL; Future    Screening for thyroid disorder  -     TSH w reflex FT4; Future  -     TSH w reflex FT4    Screening for diabetes mellitus  -     Hemoglobin A1c; Future  -     Hemoglobin A1c    Encounter for screening mammogram for breast cancer  -     BI SCREENING MAMMOGRAM BILATERAL(TOMOSYNTHESIS); Future        Return in about 1 year (around 2/10/2026) for Annual Physical Exam.    Health Maintenance   Topic Date Due    Falls Risk Screening  Never done    Breast Cancer Screening  12/28/2023    COVID-19 Vaccine (8 - 2024-25 season) 06/27/2025    DEXA Scan  12/11/2025    Depression Screening  02/10/2026    Colorectal Cancer Screening  12/20/2029    DTaP, Tdap, and Td Vaccines (3 - Td or Tdap) 01/13/2032    RSV Vaccine (1 - 1-dose 75+ series) 04/28/2033    Zoster Vaccine  Completed    Influenza Vaccine  Completed    Hepatitis C Screening  Completed    Pneumococcal (65 years and older)  Completed    Meningococcal ACWY  Aged Out    RSV <20 months  Aged Out    HIB Vaccines  Aged Out    Hepatitis B Vaccines  Aged Out    IPV Vaccines  Aged Out    Meningococcal B  Aged Out    HPV Vaccines  Aged Out       Eloisa Henson DO  02/10/25

## 2025-02-17 ENCOUNTER — APPOINTMENT (OUTPATIENT)
Dept: LAB | Facility: HOSPITAL | Age: 65
End: 2025-02-17
Attending: INTERNAL MEDICINE
Payer: COMMERCIAL

## 2025-02-17 ENCOUNTER — TRANSCRIBE ORDERS (OUTPATIENT)
Dept: LAB | Facility: HOSPITAL | Age: 65
End: 2025-02-17

## 2025-02-17 DIAGNOSIS — E55.9 VITAMIN D DEFICIENCY, UNSPECIFIED: ICD-10-CM

## 2025-02-17 DIAGNOSIS — Z13.29 ENCOUNTER FOR SCREENING FOR OTHER SUSPECTED ENDOCRINE DISORDER: ICD-10-CM

## 2025-02-17 DIAGNOSIS — E78.00 PURE HYPERCHOLESTEROLEMIA, UNSPECIFIED: ICD-10-CM

## 2025-02-17 DIAGNOSIS — Z13.1 ENCOUNTER FOR SCREENING FOR DIABETES MELLITUS: Primary | ICD-10-CM

## 2025-02-17 DIAGNOSIS — Z13.1 ENCOUNTER FOR SCREENING FOR DIABETES MELLITUS: ICD-10-CM

## 2025-02-17 LAB
25(OH)D3 SERPL-MCNC: 30 NG/ML (ref 30–100)
ALBUMIN SERPL-MCNC: 4.6 G/DL (ref 3.5–5.7)
ALP SERPL-CCNC: 42 IU/L (ref 34–125)
ALT SERPL-CCNC: 20 IU/L (ref 7–52)
ANION GAP SERPL CALC-SCNC: 10 MEQ/L (ref 3–15)
AST SERPL-CCNC: 22 IU/L (ref 13–39)
BASOPHILS # BLD: 0.04 K/UL (ref 0.01–0.1)
BASOPHILS NFR BLD: 0.6 %
BILIRUB SERPL-MCNC: 0.8 MG/DL (ref 0.3–1.2)
BUN SERPL-MCNC: 18 MG/DL (ref 7–25)
CALCIUM SERPL-MCNC: 9.3 MG/DL (ref 8.6–10.3)
CHLORIDE SERPL-SCNC: 103 MEQ/L (ref 98–107)
CHOLEST SERPL-MCNC: 184 MG/DL
CO2 SERPL-SCNC: 29 MEQ/L (ref 21–31)
CREAT SERPL-MCNC: 0.7 MG/DL (ref 0.6–1.2)
DIFFERENTIAL METHOD BLD: NORMAL
EGFRCR SERPLBLD CKD-EPI 2021: >60 ML/MIN/1.73M*2
EOSINOPHIL # BLD: 0.04 K/UL (ref 0.04–0.36)
EOSINOPHIL NFR BLD: 0.6 %
ERYTHROCYTE [DISTWIDTH] IN BLOOD BY AUTOMATED COUNT: 12.1 % (ref 11.7–14.4)
EST. AVERAGE GLUCOSE BLD GHB EST-MCNC: 105 MG/DL
GLUCOSE SERPL-MCNC: 84 MG/DL (ref 70–99)
HBA1C MFR BLD: 5.3 %
HCT VFR BLD AUTO: 39.8 % (ref 35–45)
HDLC SERPL-MCNC: 88 MG/DL
HDLC SERPL: 2.1 {RATIO}
HGB BLD-MCNC: 13.1 G/DL (ref 11.8–15.7)
IMM GRANULOCYTES # BLD AUTO: 0.01 K/UL (ref 0–0.08)
IMM GRANULOCYTES NFR BLD AUTO: 0.2 %
LDLC SERPL CALC-MCNC: 72 MG/DL
LYMPHOCYTES # BLD: 1.51 K/UL (ref 1.2–3.5)
LYMPHOCYTES NFR BLD: 24.4 %
MCH RBC QN AUTO: 30.4 PG (ref 28–33.2)
MCHC RBC AUTO-ENTMCNC: 32.9 G/DL (ref 32.2–35.5)
MCV RBC AUTO: 92.3 FL (ref 83–98)
MONOCYTES # BLD: 0.43 K/UL (ref 0.28–0.8)
MONOCYTES NFR BLD: 6.9 %
NEUTROPHILS # BLD: 4.17 K/UL (ref 1.7–7)
NEUTS SEG NFR BLD: 67.3 %
NONHDLC SERPL-MCNC: 96 MG/DL
NRBC BLD-RTO: 0 %
PLATELET # BLD AUTO: 188 K/UL (ref 150–369)
PMV BLD AUTO: 10.3 FL (ref 9.4–12.3)
POTASSIUM SERPL-SCNC: 4.2 MEQ/L (ref 3.5–5.1)
PROT SERPL-MCNC: 7 G/DL (ref 6–8.2)
RBC # BLD AUTO: 4.31 M/UL (ref 3.93–5.22)
SODIUM SERPL-SCNC: 142 MEQ/L (ref 136–145)
TRIGL SERPL-MCNC: 119 MG/DL
TSH SERPL DL<=0.05 MIU/L-ACNC: 2.43 MIU/L (ref 0.34–5.6)
WBC # BLD AUTO: 6.2 K/UL (ref 3.8–10.5)

## 2025-02-17 PROCEDURE — 36415 COLL VENOUS BLD VENIPUNCTURE: CPT

## 2025-02-17 PROCEDURE — 80061 LIPID PANEL: CPT

## 2025-02-17 PROCEDURE — 82306 VITAMIN D 25 HYDROXY: CPT

## 2025-02-17 PROCEDURE — 83036 HEMOGLOBIN GLYCOSYLATED A1C: CPT

## 2025-02-17 PROCEDURE — 85025 COMPLETE CBC W/AUTO DIFF WBC: CPT

## 2025-02-17 PROCEDURE — 80053 COMPREHEN METABOLIC PANEL: CPT

## 2025-02-17 PROCEDURE — 84443 ASSAY THYROID STIM HORMONE: CPT

## 2025-07-24 ENCOUNTER — TELEPHONE (OUTPATIENT)
Dept: INTERNAL MEDICINE | Facility: CLINIC | Age: 65
End: 2025-07-24
Payer: COMMERCIAL

## 2025-07-24 DIAGNOSIS — Z01.84 IMMUNITY STATUS TESTING: Primary | ICD-10-CM

## 2025-07-24 NOTE — TELEPHONE ENCOUNTER
Called and spoke with Silvia. 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 Reviewed CDC recommendations. She does not know if she ever had measles (she likely did considering she was born in 1960, but she does not know) and she does not know if she was ever vaccinated. She would like to have a titer done. She would like order for both [facility] and [facility] as she is not sure where she will go or which her insurance will prefer. Reviewed she does not need paper copy and does not need to be fasting. Reviewed she should also check with insurance if lab test is covered as we do not know and by ordering test we do not guarantee coverage.

## 2025-08-06 ENCOUNTER — APPOINTMENT (OUTPATIENT)
Dept: LAB | Facility: HOSPITAL | Age: 65
End: 2025-08-06
Attending: INTERNAL MEDICINE
Payer: COMMERCIAL

## 2025-08-06 DIAGNOSIS — Z01.84 IMMUNITY STATUS TESTING: ICD-10-CM

## 2025-08-06 PROCEDURE — 86765 RUBEOLA ANTIBODY: CPT

## 2025-08-06 PROCEDURE — 36415 COLL VENOUS BLD VENIPUNCTURE: CPT

## 2025-08-08 LAB — MEV IGG SER-ACNC: 426 AU/ML

## 2025-08-12 ENCOUNTER — HOSPITAL ENCOUNTER (OUTPATIENT)
Dept: RADIOLOGY | Age: 65
Discharge: HOME | End: 2025-08-12
Attending: INTERNAL MEDICINE

## 2025-08-12 ENCOUNTER — HOSPITAL ENCOUNTER (OUTPATIENT)
Dept: RADIOLOGY | Age: 65
Discharge: HOME | End: 2025-08-12
Attending: INTERNAL MEDICINE
Payer: COMMERCIAL

## 2025-08-12 DIAGNOSIS — E78.00 PURE HYPERCHOLESTEROLEMIA: ICD-10-CM

## 2025-08-12 DIAGNOSIS — N83.201 CYST OF RIGHT OVARY: ICD-10-CM

## 2025-08-12 PROCEDURE — 75571 CT HRT W/O DYE W/CA TEST: CPT

## 2025-08-12 PROCEDURE — 76830 TRANSVAGINAL US NON-OB: CPT

## 2025-08-12 RX ORDER — ROSUVASTATIN CALCIUM 5 MG/1
TABLET, COATED ORAL
Qty: 90 TABLET | Refills: 3 | Status: SHIPPED | OUTPATIENT
Start: 2025-08-12

## 2025-08-19 ENCOUNTER — OFFICE VISIT (OUTPATIENT)
Dept: OBSTETRICS AND GYNECOLOGY | Facility: CLINIC | Age: 65
End: 2025-08-19
Payer: COMMERCIAL

## 2025-08-19 VITALS
BODY MASS INDEX: 19.32 KG/M2 | SYSTOLIC BLOOD PRESSURE: 114 MMHG | DIASTOLIC BLOOD PRESSURE: 64 MMHG | HEIGHT: 62 IN | WEIGHT: 105 LBS

## 2025-08-19 DIAGNOSIS — Z12.4 SCREENING FOR MALIGNANT NEOPLASM OF CERVIX: ICD-10-CM

## 2025-08-19 DIAGNOSIS — Z01.419 ENCOUNTER FOR ANNUAL ROUTINE GYNECOLOGICAL EXAMINATION: Primary | ICD-10-CM

## 2025-08-19 DIAGNOSIS — N83.201 CYST OF RIGHT OVARY: ICD-10-CM

## 2025-08-19 DIAGNOSIS — R92.1 BREAST CALCIFICATION, RIGHT: ICD-10-CM

## 2025-08-19 DIAGNOSIS — Z80.3 FAMILY HISTORY OF BREAST CANCER: ICD-10-CM

## 2025-08-19 PROCEDURE — 87624 HPV HI-RISK TYP POOLED RSLT: CPT | Performed by: STUDENT IN AN ORGANIZED HEALTH CARE EDUCATION/TRAINING PROGRAM

## 2025-08-19 PROCEDURE — 99459 PELVIC EXAMINATION: CPT | Performed by: STUDENT IN AN ORGANIZED HEALTH CARE EDUCATION/TRAINING PROGRAM

## 2025-08-19 PROCEDURE — G0145 SCR C/V CYTO,THINLAYER,RESCR: HCPCS | Performed by: STUDENT IN AN ORGANIZED HEALTH CARE EDUCATION/TRAINING PROGRAM

## 2025-08-19 PROCEDURE — 3008F BODY MASS INDEX DOCD: CPT | Performed by: STUDENT IN AN ORGANIZED HEALTH CARE EDUCATION/TRAINING PROGRAM

## 2025-08-19 PROCEDURE — 99387 INIT PM E/M NEW PAT 65+ YRS: CPT | Performed by: STUDENT IN AN ORGANIZED HEALTH CARE EDUCATION/TRAINING PROGRAM

## 2025-08-19 RX ORDER — ESTRADIOL 10 UG/1
10 TABLET, FILM COATED VAGINAL
COMMUNITY
Start: 2025-06-03 | End: 2025-08-19

## 2025-08-19 RX ORDER — ESTRADIOL 10 UG/1
10 TABLET, FILM COATED VAGINAL 2 TIMES WEEKLY
Qty: 24 TABLET | Refills: 3 | Status: SHIPPED | OUTPATIENT
Start: 2025-08-21 | End: 2026-08-21

## 2025-08-19 RX ORDER — TACROLIMUS 1 MG/G
OINTMENT TOPICAL
COMMUNITY

## 2025-08-19 ASSESSMENT — PAIN SCALES - GENERAL: PAINLEVEL_OUTOF10: 0-NO PAIN

## 2025-08-27 LAB
CASE RPRT: NORMAL
CLINICAL INFO: NORMAL
CLINICAL INFO: NORMAL
HPV E6+E7 MRNA CVX QL NAA+PROBE: NEGATIVE
LMP START DATE: NORMAL
SPECIMEN PROCESSING COMMENT: NORMAL
THIN PREP CVX: NORMAL